# Patient Record
Sex: FEMALE | Race: WHITE | Employment: OTHER | URBAN - METROPOLITAN AREA
[De-identification: names, ages, dates, MRNs, and addresses within clinical notes are randomized per-mention and may not be internally consistent; named-entity substitution may affect disease eponyms.]

---

## 2020-01-08 ENCOUNTER — HOSPITAL ENCOUNTER (OUTPATIENT)
Dept: WOMENS IMAGING | Age: 60
Discharge: HOME | End: 2020-01-08
Payer: COMMERCIAL

## 2020-01-08 PROCEDURE — 77063 BREAST TOMOSYNTHESIS BI: CPT

## 2020-03-05 RX ORDER — OXYBUTYNIN CHLORIDE 5 MG/1
5 TABLET ORAL DAILY
COMMUNITY
End: 2022-09-16 | Stop reason: SDUPTHER

## 2020-03-05 RX ORDER — IBUPROFEN 200 MG
1 CAPSULE ORAL 2 TIMES DAILY
COMMUNITY

## 2020-03-05 RX ORDER — TRAZODONE HYDROCHLORIDE 50 MG/1
100 TABLET ORAL NIGHTLY
COMMUNITY
End: 2022-07-25 | Stop reason: SDUPTHER

## 2020-03-05 RX ORDER — M-VIT,TX,IRON,MINS/CALC/FOLIC 27MG-0.4MG
1 TABLET ORAL DAILY
COMMUNITY

## 2020-03-05 NOTE — PROGRESS NOTES
Obstructive Sleep Apnea (CHANTELL) Screening     Patient:  Matias Flynn    YOB: 1960      Medical Record #:  [id number]                     Date:  3/5/2020     1. Are you a loud and/or regular snorer? []  Yes       [x] No    2. Have you been observed to gasp or stop breathing during sleep? []  Yes       [x] No    3. Do you feel tired or groggy upon awakening or do you awaken with a headache? [x]  Yes       [] No    4. Are you often tired or fatigued during the wake time hours? []  Yes       [x] No    5. Do you fall asleep sitting, reading, watching TV or driving? []  Yes       [x] No    6. Do you often have problems with memory or concentration? []  Yes       [x] No    **If patient's score is ? 3 they are considered high risk for CHANTELL. Notify the anesthesiologist of the high risk and document in focus note. Note:  If the patient's BMI is more than 35 kg m¯² , has neck circumference > 40 cm, and/or high blood pressure the risk is greater (© American Sleep Apnea Association, 2006).

## 2020-03-15 ENCOUNTER — ANESTHESIA EVENT (OUTPATIENT)
Dept: OPERATING ROOM | Age: 60
End: 2020-03-15
Payer: COMMERCIAL

## 2020-03-16 ENCOUNTER — ANESTHESIA (OUTPATIENT)
Dept: OPERATING ROOM | Age: 60
End: 2020-03-16
Payer: COMMERCIAL

## 2020-03-16 ENCOUNTER — APPOINTMENT (OUTPATIENT)
Dept: GENERAL RADIOLOGY | Age: 60
End: 2020-03-16
Attending: ORTHOPAEDIC SURGERY
Payer: COMMERCIAL

## 2020-03-16 ENCOUNTER — HOSPITAL ENCOUNTER (OUTPATIENT)
Age: 60
Setting detail: OUTPATIENT SURGERY
Discharge: HOME | End: 2020-03-16
Attending: ORTHOPAEDIC SURGERY | Admitting: ORTHOPAEDIC SURGERY
Payer: COMMERCIAL

## 2020-03-16 VITALS
BODY MASS INDEX: 25.69 KG/M2 | DIASTOLIC BLOOD PRESSURE: 63 MMHG | HEART RATE: 83 BPM | HEIGHT: 63 IN | WEIGHT: 145 LBS | RESPIRATION RATE: 16 BRPM | OXYGEN SATURATION: 96 % | SYSTOLIC BLOOD PRESSURE: 124 MMHG | TEMPERATURE: 98.3 F

## 2020-03-16 VITALS — SYSTOLIC BLOOD PRESSURE: 101 MMHG | OXYGEN SATURATION: 98 % | DIASTOLIC BLOOD PRESSURE: 65 MMHG

## 2020-03-16 PROCEDURE — 6360000002 HC RX W HCPCS: Performed by: NURSE ANESTHETIST, CERTIFIED REGISTERED

## 2020-03-16 PROCEDURE — 3600000003 HC SURGERY LEVEL 3 BASE: Performed by: ORTHOPAEDIC SURGERY

## 2020-03-16 PROCEDURE — 2500000003 HC RX 250 WO HCPCS: Performed by: NURSE ANESTHETIST, CERTIFIED REGISTERED

## 2020-03-16 PROCEDURE — 3700000000 HC ANESTHESIA ATTENDED CARE: Performed by: ORTHOPAEDIC SURGERY

## 2020-03-16 PROCEDURE — 6360000004 HC RX CONTRAST MEDICATION: Performed by: ORTHOPAEDIC SURGERY

## 2020-03-16 PROCEDURE — 7100000011 HC PHASE II RECOVERY - ADDTL 15 MIN: Performed by: ORTHOPAEDIC SURGERY

## 2020-03-16 PROCEDURE — 3700000001 HC ADD 15 MINUTES (ANESTHESIA): Performed by: ORTHOPAEDIC SURGERY

## 2020-03-16 PROCEDURE — 2500000003 HC RX 250 WO HCPCS: Performed by: ORTHOPAEDIC SURGERY

## 2020-03-16 PROCEDURE — 6360000002 HC RX W HCPCS: Performed by: ORTHOPAEDIC SURGERY

## 2020-03-16 PROCEDURE — 20610 DRAIN/INJ JOINT/BURSA W/O US: CPT

## 2020-03-16 PROCEDURE — 7100000000 HC PACU RECOVERY - FIRST 15 MIN: Performed by: ORTHOPAEDIC SURGERY

## 2020-03-16 PROCEDURE — 3600000013 HC SURGERY LEVEL 3 ADDTL 15MIN: Performed by: ORTHOPAEDIC SURGERY

## 2020-03-16 PROCEDURE — 7100000001 HC PACU RECOVERY - ADDTL 15 MIN: Performed by: ORTHOPAEDIC SURGERY

## 2020-03-16 PROCEDURE — 2580000003 HC RX 258: Performed by: ANESTHESIOLOGY

## 2020-03-16 PROCEDURE — 2709999900 HC NON-CHARGEABLE SUPPLY: Performed by: ORTHOPAEDIC SURGERY

## 2020-03-16 PROCEDURE — 3209999900 FLUORO FOR SURGICAL PROCEDURES

## 2020-03-16 PROCEDURE — 7100000010 HC PHASE II RECOVERY - FIRST 15 MIN: Performed by: ORTHOPAEDIC SURGERY

## 2020-03-16 RX ORDER — DIPHENHYDRAMINE HYDROCHLORIDE 50 MG/ML
6.25 INJECTION INTRAMUSCULAR; INTRAVENOUS
Status: DISCONTINUED | OUTPATIENT
Start: 2020-03-16 | End: 2020-03-16 | Stop reason: HOSPADM

## 2020-03-16 RX ORDER — OXYCODONE HYDROCHLORIDE AND ACETAMINOPHEN 5; 325 MG/1; MG/1
1 TABLET ORAL PRN
Status: DISCONTINUED | OUTPATIENT
Start: 2020-03-16 | End: 2020-03-16 | Stop reason: HOSPADM

## 2020-03-16 RX ORDER — HYDRALAZINE HYDROCHLORIDE 20 MG/ML
5 INJECTION INTRAMUSCULAR; INTRAVENOUS EVERY 30 MIN PRN
Status: DISCONTINUED | OUTPATIENT
Start: 2020-03-16 | End: 2020-03-16 | Stop reason: HOSPADM

## 2020-03-16 RX ORDER — BUPIVACAINE HYDROCHLORIDE 2.5 MG/ML
INJECTION, SOLUTION INFILTRATION; PERINEURAL PRN
Status: DISCONTINUED | OUTPATIENT
Start: 2020-03-16 | End: 2020-03-16 | Stop reason: ALTCHOICE

## 2020-03-16 RX ORDER — TRIAMCINOLONE ACETONIDE 40 MG/ML
INJECTION, SUSPENSION INTRA-ARTICULAR; INTRAMUSCULAR PRN
Status: DISCONTINUED | OUTPATIENT
Start: 2020-03-16 | End: 2020-03-16 | Stop reason: ALTCHOICE

## 2020-03-16 RX ORDER — SODIUM CHLORIDE, SODIUM LACTATE, POTASSIUM CHLORIDE, CALCIUM CHLORIDE 600; 310; 30; 20 MG/100ML; MG/100ML; MG/100ML; MG/100ML
INJECTION, SOLUTION INTRAVENOUS CONTINUOUS
Status: DISCONTINUED | OUTPATIENT
Start: 2020-03-16 | End: 2020-03-16 | Stop reason: HOSPADM

## 2020-03-16 RX ORDER — LABETALOL HYDROCHLORIDE 5 MG/ML
5 INJECTION, SOLUTION INTRAVENOUS
Status: DISCONTINUED | OUTPATIENT
Start: 2020-03-16 | End: 2020-03-16 | Stop reason: HOSPADM

## 2020-03-16 RX ORDER — OXYCODONE HYDROCHLORIDE AND ACETAMINOPHEN 5; 325 MG/1; MG/1
2 TABLET ORAL PRN
Status: DISCONTINUED | OUTPATIENT
Start: 2020-03-16 | End: 2020-03-16 | Stop reason: HOSPADM

## 2020-03-16 RX ORDER — SODIUM CHLORIDE 0.9 % (FLUSH) 0.9 %
10 SYRINGE (ML) INJECTION EVERY 12 HOURS SCHEDULED
Status: DISCONTINUED | OUTPATIENT
Start: 2020-03-16 | End: 2020-03-16 | Stop reason: HOSPADM

## 2020-03-16 RX ORDER — ONDANSETRON 2 MG/ML
4 INJECTION INTRAMUSCULAR; INTRAVENOUS EVERY 30 MIN PRN
Status: DISCONTINUED | OUTPATIENT
Start: 2020-03-16 | End: 2020-03-16 | Stop reason: HOSPADM

## 2020-03-16 RX ORDER — PROPOFOL 10 MG/ML
INJECTION, EMULSION INTRAVENOUS PRN
Status: DISCONTINUED | OUTPATIENT
Start: 2020-03-16 | End: 2020-03-16 | Stop reason: SDUPTHER

## 2020-03-16 RX ORDER — LIDOCAINE HYDROCHLORIDE 10 MG/ML
1 INJECTION, SOLUTION EPIDURAL; INFILTRATION; INTRACAUDAL; PERINEURAL
Status: DISCONTINUED | OUTPATIENT
Start: 2020-03-16 | End: 2020-03-16 | Stop reason: HOSPADM

## 2020-03-16 RX ORDER — LIDOCAINE HYDROCHLORIDE 20 MG/ML
INJECTION, SOLUTION EPIDURAL; INFILTRATION; INTRACAUDAL; PERINEURAL PRN
Status: DISCONTINUED | OUTPATIENT
Start: 2020-03-16 | End: 2020-03-16 | Stop reason: SDUPTHER

## 2020-03-16 RX ORDER — SODIUM CHLORIDE 0.9 % (FLUSH) 0.9 %
10 SYRINGE (ML) INJECTION PRN
Status: DISCONTINUED | OUTPATIENT
Start: 2020-03-16 | End: 2020-03-16 | Stop reason: HOSPADM

## 2020-03-16 RX ADMIN — CEFAZOLIN 2 G: 10 INJECTION, POWDER, FOR SOLUTION INTRAVENOUS; PARENTERAL at 07:01

## 2020-03-16 RX ADMIN — SODIUM CHLORIDE, POTASSIUM CHLORIDE, SODIUM LACTATE AND CALCIUM CHLORIDE: 600; 310; 30; 20 INJECTION, SOLUTION INTRAVENOUS at 07:01

## 2020-03-16 RX ADMIN — LIDOCAINE HYDROCHLORIDE 50 MG: 20 INJECTION, SOLUTION EPIDURAL; INFILTRATION; INTRACAUDAL; PERINEURAL at 07:03

## 2020-03-16 RX ADMIN — PROPOFOL 150 MG: 10 INJECTION, EMULSION INTRAVENOUS at 07:03

## 2020-03-16 RX ADMIN — PROPOFOL 50 MG: 10 INJECTION, EMULSION INTRAVENOUS at 07:04

## 2020-03-16 RX ADMIN — SODIUM CHLORIDE, POTASSIUM CHLORIDE, SODIUM LACTATE AND CALCIUM CHLORIDE: 600; 310; 30; 20 INJECTION, SOLUTION INTRAVENOUS at 06:07

## 2020-03-16 ASSESSMENT — PAIN - FUNCTIONAL ASSESSMENT: PAIN_FUNCTIONAL_ASSESSMENT: 0-10

## 2020-03-16 ASSESSMENT — PULMONARY FUNCTION TESTS: PIF_VALUE: 1

## 2020-03-16 NOTE — ANESTHESIA POSTPROCEDURE EVALUATION
Department of Anesthesiology  Postprocedure Note    Patient: Gunnar Caldwell  MRN: [id number]  YOB: 1960  Date of evaluation: 3/16/2020  Time:  6:48 PM     Procedure Summary     Date:  03/16/20 Room / Location:  [facility]    Anesthesia Start:  9650 Anesthesia Stop:  0900    Procedure:  LEFT HIP INJECTION (Left Hip) Diagnosis:       Left hip pain      (LEFT HIP PAIN)    Surgeon:  Maninder Manley MD Responsible Provider:      Anesthesia Type:  general ASA Status:  2          Anesthesia Type: general    Lizz Phase I: Lizz Score: 9    Lizz Phase II: Lizz Score: 10    Last vitals: Reviewed and per EMR flowsheets.        Anesthesia Post Evaluation    Comments: Postoperative Anesthesia Note    Name:    Gunnar Caldwell  MRN:      [id number]    Patient Vitals in the past 12 hrs:  03/16/20 0739, BP:124/63, Temp:98.3 °F (36.8 °C), Temp src:Temporal, Pulse:83, Resp:16, SpO2:96 %  03/16/20 0730, BP:(!) 109/58, Pulse:68, Resp:18, SpO2:97 %  03/16/20 0722, BP:100/60, Pulse:70, Resp:18, SpO2:95 %  03/16/20 0717, BP:(!) 100/59, Temp:97.8 °F (36.6 °C), Temp src:Temporal, Pulse:72, Resp:11, SpO2:93 %     LABS:    CBC  Lab Results       Component                Value               Date/Time                  WBC                      6.5                 10/21/2013 05:49 AM        HGB                      9.8 (L)             10/21/2013 05:49 AM        HCT                      29.0 (L)            10/21/2013 05:49 AM        PLT                      177                 10/21/2013 05:49 AM   RENAL  Lab Results       Component                Value               Date/Time                  NA                       136                 10/21/2013 05:49 AM        K                        4.3                 10/21/2013 05:49 AM        CL                       100                 10/21/2013 05:49 AM        CO2                      32                  10/21/2013 05:49 AM        BUN                      6 (L) 10/21/2013 05:49 AM        CREATININE               0.6                 10/21/2013 05:49 AM        GLUCOSE                  93                  10/21/2013 05:49 AM   NELLI  Lab Results       Component                Value               Date/Time                  PROTIME                  10.0                10/17/2013 12:23 PM        INR                      0.89                10/17/2013 12:23 PM     Intake & Output: In: 150 (I.V.:150)  Out: -     Nausea & Vomiting:  No    Level of Consciousness:  Awake    Pain Assessment:  Adequate analgesia    Anesthesia Complications:  No apparent anesthetic complications    SUMMARY      Vital signs stable  OK to discharge from Stage I post anesthesia care.   Care transferred from Anesthesiology department on discharge from perioperative area

## 2020-03-22 NOTE — OP NOTE
[address] [address]                                OPERATIVE REPORT    PATIENT NAME: Colleen Lim                       :        1960  MED REC NO:   [id number]                          ROOM:  ACCOUNT NO:   [de-identified]                           ADMIT DATE: 2020  PROVIDER:     Evette Spencer MD    DATE OF PROCEDURE:  2020    PREOPERATIVE DIAGNOSIS:  Left hip pain status post bipolar hip. OPERATION PERFORMED:  aspiration under fluoroscopic guidance. SURGEON:  Evette Spencer MD    COMPLICATIONS:  None. DRAINS AND TUBES:  None. INDICATIONS FOR SURGERY:  A pleasant female, bipolar hip with pain,  presents for the above procedure. Risks, benefits, and alternatives  were discussed including neurologic injury, vascular injury, infection,  and DVT. Other potential complications were discussed, alternatives  were discussed and she consented to the procedure. OPERATIVE PROCEDURE:  The patient was taken to the operating room and  placed supine on the operating table after successful induction of  general anesthesia. The left hip was prepped and draped in the usual  fashion. The spinal needle was placed through the lateral aspect of the  thigh to inject water-soluble contrast as an arthrogram to confirm  needle placement, followed by injection of 2 mL of Kenalog and 4 mL of  Marcaine. The needle was withdrawn and the patient was awakened and  taken to Recovery, where she arrived in a stable condition.         Malina Reese MD    D: 2020 12:27:57       T: 2020 19:20:48     [email or web address]  Job#: [id number]     Doc#: [id number]    CC:

## 2020-08-14 ENCOUNTER — TELEPHONE (OUTPATIENT)
Dept: ORTHOPEDIC SURGERY | Age: 60
End: 2020-08-14

## 2021-03-12 ENCOUNTER — HOSPITAL ENCOUNTER (OUTPATIENT)
Dept: WOMENS IMAGING | Age: 61
Discharge: HOME | End: 2021-03-12
Payer: COMMERCIAL

## 2021-03-12 DIAGNOSIS — Z12.31 VISIT FOR SCREENING MAMMOGRAM: ICD-10-CM

## 2021-03-12 PROCEDURE — 77063 BREAST TOMOSYNTHESIS BI: CPT

## 2022-03-28 SDOH — HEALTH STABILITY: PHYSICAL HEALTH: ON AVERAGE, HOW MANY DAYS PER WEEK DO YOU ENGAGE IN MODERATE TO STRENUOUS EXERCISE (LIKE A BRISK WALK)?: 0 DAYS

## 2022-03-28 SDOH — HEALTH STABILITY: PHYSICAL HEALTH: ON AVERAGE, HOW MANY MINUTES DO YOU ENGAGE IN EXERCISE AT THIS LEVEL?: 0 MIN

## 2022-03-29 ENCOUNTER — OFFICE VISIT (OUTPATIENT)
Dept: FAMILY MEDICINE CLINIC | Age: 62
End: 2022-03-29
Payer: MEDICARE

## 2022-03-29 VITALS
HEART RATE: 70 BPM | BODY MASS INDEX: 25.91 KG/M2 | TEMPERATURE: 98.1 F | HEIGHT: 63 IN | WEIGHT: 146.2 LBS | DIASTOLIC BLOOD PRESSURE: 70 MMHG | SYSTOLIC BLOOD PRESSURE: 110 MMHG | OXYGEN SATURATION: 97 %

## 2022-03-29 DIAGNOSIS — K21.9 GASTROESOPHAGEAL REFLUX DISEASE WITHOUT ESOPHAGITIS: ICD-10-CM

## 2022-03-29 DIAGNOSIS — M25.559 HIP PAIN: ICD-10-CM

## 2022-03-29 DIAGNOSIS — Z76.89 ENCOUNTER TO ESTABLISH CARE: Primary | ICD-10-CM

## 2022-03-29 DIAGNOSIS — Z12.31 ENCOUNTER FOR SCREENING MAMMOGRAM FOR MALIGNANT NEOPLASM OF BREAST: ICD-10-CM

## 2022-03-29 PROCEDURE — G8484 FLU IMMUNIZE NO ADMIN: HCPCS | Performed by: NURSE PRACTITIONER

## 2022-03-29 PROCEDURE — 99204 OFFICE O/P NEW MOD 45 MIN: CPT | Performed by: NURSE PRACTITIONER

## 2022-03-29 PROCEDURE — 1036F TOBACCO NON-USER: CPT | Performed by: NURSE PRACTITIONER

## 2022-03-29 PROCEDURE — G8427 DOCREV CUR MEDS BY ELIG CLIN: HCPCS | Performed by: NURSE PRACTITIONER

## 2022-03-29 PROCEDURE — G8419 CALC BMI OUT NRM PARAM NOF/U: HCPCS | Performed by: NURSE PRACTITIONER

## 2022-03-29 PROCEDURE — 3017F COLORECTAL CA SCREEN DOC REV: CPT | Performed by: NURSE PRACTITIONER

## 2022-03-29 RX ORDER — OMEPRAZOLE 20 MG/1
20 CAPSULE, DELAYED RELEASE ORAL DAILY
Qty: 90 CAPSULE | Refills: 2 | Status: SHIPPED | OUTPATIENT
Start: 2022-03-29 | End: 2022-09-19

## 2022-03-29 SDOH — ECONOMIC STABILITY: FOOD INSECURITY: WITHIN THE PAST 12 MONTHS, THE FOOD YOU BOUGHT JUST DIDN'T LAST AND YOU DIDN'T HAVE MONEY TO GET MORE.: NEVER TRUE

## 2022-03-29 SDOH — ECONOMIC STABILITY: FOOD INSECURITY: WITHIN THE PAST 12 MONTHS, YOU WORRIED THAT YOUR FOOD WOULD RUN OUT BEFORE YOU GOT MONEY TO BUY MORE.: NEVER TRUE

## 2022-03-29 ASSESSMENT — ENCOUNTER SYMPTOMS
VOMITING: 0
WHEEZING: 0
COUGH: 0
DIARRHEA: 0
NAUSEA: 0
SHORTNESS OF BREATH: 0
ABDOMINAL PAIN: 0

## 2022-03-29 ASSESSMENT — SOCIAL DETERMINANTS OF HEALTH (SDOH): HOW HARD IS IT FOR YOU TO PAY FOR THE VERY BASICS LIKE FOOD, HOUSING, MEDICAL CARE, AND HEATING?: NOT HARD AT ALL
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
## 2022-03-29 NOTE — PATIENT INSTRUCTIONS
Patient Education        Hip Pain: Care Instructions  Your Care Instructions     Hip pain may be caused by many things, including overuse, a fall, or a twisting movement. Another cause of hip pain is arthritis. Your pain may increase whenyou stand up, walk, or squat. The pain may come and go or may be constant. Home treatment can help relieve hip pain, swelling, and stiffness. If your painis ongoing, you may need more tests and treatment. Follow-up care is a key part of your treatment and safety. Be sure to make and go to all appointments, and call your doctor if you are having problems. It's also a good idea to know your test results and keep alist of the medicines you take. How can you care for yourself at home?  Take pain medicines exactly as directed. ? If the doctor gave you a prescription medicine for pain, take it as prescribed. ? If you are not taking a prescription pain medicine, ask your doctor if you can take an over-the-counter medicine.  Rest and protect your hip. Take a break from any activity, including standing or walking, that may cause pain.  Put ice or a cold pack against your hip for 10 to 20 minutes at a time. Try to do this every 1 to 2 hours for the next 3 days (when you are awake) or until the swelling goes down. Put a thin cloth between the ice and your skin.  Sleep on your healthy side with a pillow between your knees, or sleep on your back with pillows under your knees.  If there is no swelling, you can put moist heat, a heating pad, or a warm cloth on your hip. Do gentle stretching exercises to help keep your hip flexible.  Learn how to prevent falls. Have your vision and hearing checked regularly. Wear slippers or shoes with a nonskid sole.  Stay at a healthy weight.  Wear comfortable shoes. When should you call for help? Call 911 anytime you think you may need emergency care.  For example, call if:     You have sudden chest pain and shortness of breath, or you cough up blood.      You are not able to stand or walk or bear weight.      Your buttocks, legs, or feet feel numb or tingly.      Your leg or foot is cool or pale or changes color.      You have severe pain. Call your doctor now or seek immediate medical care if:     You have signs of infection, such as:  ? Increased pain, swelling, warmth, or redness in the hip area. ? Red streaks leading from the hip area. ? Pus draining from the hip area. ? A fever.      You have signs of a blood clot, such as:  ? Pain in your calf, back of the knee, thigh, or groin. ? Redness and swelling in your leg or groin.      You are not able to bend, straighten, or move your leg normally.      You have trouble urinating or having bowel movements. Watch closely for changes in your health, and be sure to contact your doctor if:     You do not get better as expected. Where can you learn more? Go to https://CleanSlate.ByteActive. org and sign in to your PunchTab account. Enter U923 in the Step-In box to learn more about \"Hip Pain: Care Instructions. \"     If you do not have an account, please click on the \"Sign Up Now\" link. Current as of: July 1, 2021               Content Version: 13.2  © 2006-2022 Healthwise, Incorporated. Care instructions adapted under license by [facility] ([facility]). If you have questions about a medical condition or this instruction, always ask your healthcare professional. [place] [address] any warranty or liability for your use of this information.

## 2022-04-04 ENCOUNTER — OFFICE VISIT (OUTPATIENT)
Dept: ORTHOPEDIC SURGERY | Age: 62
End: 2022-04-04
Payer: MEDICARE

## 2022-04-04 ENCOUNTER — TELEPHONE (OUTPATIENT)
Dept: ORTHOPEDIC SURGERY | Age: 62
End: 2022-04-04

## 2022-04-04 VITALS — HEIGHT: 63 IN | WEIGHT: 146 LBS | BODY MASS INDEX: 25.87 KG/M2

## 2022-04-04 DIAGNOSIS — Z96.642 HISTORY OF LEFT HIP HEMIARTHROPLASTY: Primary | ICD-10-CM

## 2022-04-04 DIAGNOSIS — M16.7 OTHER SECONDARY OSTEOARTHRITIS OF LEFT HIP: ICD-10-CM

## 2022-04-04 PROCEDURE — G8427 DOCREV CUR MEDS BY ELIG CLIN: HCPCS | Performed by: ORTHOPAEDIC SURGERY

## 2022-04-04 PROCEDURE — 99213 OFFICE O/P EST LOW 20 MIN: CPT | Performed by: ORTHOPAEDIC SURGERY

## 2022-04-04 PROCEDURE — 3017F COLORECTAL CA SCREEN DOC REV: CPT | Performed by: ORTHOPAEDIC SURGERY

## 2022-04-04 PROCEDURE — G8419 CALC BMI OUT NRM PARAM NOF/U: HCPCS | Performed by: ORTHOPAEDIC SURGERY

## 2022-04-04 PROCEDURE — 1036F TOBACCO NON-USER: CPT | Performed by: ORTHOPAEDIC SURGERY

## 2022-04-04 RX ORDER — IBUPROFEN 200 MG
200 TABLET ORAL EVERY 6 HOURS PRN
Status: ON HOLD | COMMUNITY
End: 2022-05-18 | Stop reason: HOSPADM
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
## 2022-04-06 ENCOUNTER — TELEPHONE (OUTPATIENT)
Dept: ORTHOPEDIC SURGERY | Age: 62
End: 2022-04-06

## 2022-04-19 ENCOUNTER — TELEPHONE (OUTPATIENT)
Dept: FAMILY MEDICINE CLINIC | Age: 62
End: 2022-04-19

## 2022-04-19 NOTE — TELEPHONE ENCOUNTER
Pt is scheduled for a preop h & p on 5-9 and she wants to know if Kimani Hernandez wants for her to get any additional bw done? Also pt signed a release form to have her records released from her previous PCP and she wants to know if Kimani Hernandez wants any other records released? Pl advise.

## 2022-04-19 NOTE — TELEPHONE ENCOUNTER
We can wait and see what labs her last PCP completed. The only additional labs I would add would be a Lipid panel, if this was completed and normal at her last PCP office it may not be needed. If she seen any specialist in the past, we could get those records.

## 2022-04-21 ENCOUNTER — TELEPHONE (OUTPATIENT)
Dept: ORTHOPEDIC SURGERY | Age: 62
End: 2022-04-21

## 2022-04-21 NOTE — TELEPHONE ENCOUNTER
Auth: # [id number]    Date: 05/18/22 thru 06/17/22  Type of SX:  OUTPATIENT/Observation  Location: W  CPT: 56754   DX Code: M00.139B  SX area:  hip  Insurance: [facility]

## 2022-05-05 NOTE — PROGRESS NOTES
Preoperative H&P to be done byDALE Denney NP on 5/5/22 [facility]).   JET class on 5/9/22    [facility] office called to have her make addendum in her h&p that patient is medically cleared left message with Cindy at office    Calleen Kussmaul from [facility] Cruz office called and stated that mary roberto is cleared for surgery and addendum note in epic and under h&p

## 2022-05-05 NOTE — PROGRESS NOTES
PRE-OP INSTRUCTIONS     · Do not eat or drink anything after 12:00 midnight prior to surgery. This includes water, chewing gum, mints and ice chips. You may brush your teeth and gargle the morning of surgery but DO  NOT SWALLOW THE WATER. Take the following medications with a small sip of water on the morning of surgery:    · If you use an inhaler, please use it the morning of surgery and bring with you to hospital.    · You may be asked to stop blood thinners such as:  Coumadin, Plavix, Fragmin and lovenox. Please check with your doctor before stopping these or any other medications. · Aspirin, ibuprofen, advil and naproxen, any anti-inflammatory products should be stopped for a week prior to your surgery. · Do not smoke and do not drink any alcoholic beverages 24 hours prior to your surgery. · Please do not wear any jewelry or body piercings on the day of surgery. · Please wear something simple, loose fitting clothing to the hospital.  Do not wear any make-up(including eye make-up) or nail polish on your fingers and toes. · As part of our patient safety program to minimize surgical infections, we ask you to do the followin. Please notify your surgeon if you develop any illness between now                          and the day of your surgery. This includes a cough, cold, fever, sore                       throat, nausea, vomiting, diarrhea, etc.  Also please notify your                                  surgeon if you experience dizziness, shortness of breath or                       Blurred vision between now and the time of your surgery. 2.  Please notify your surgeon of any open or redden areas that may                        look infected                     3.  DO NOT shave your operative site 96 hours(four days) prior to                                  surgery.                         4.  Shower the week before surgery with an antibacterial soap, such as                       dial, safeguard, etc.                         5.  Three(3) days prior to your surgery, cleanse the operative site with                          Hibiclens(anti-microbial soap). This soap may dry your skin, please                          do not apply any oils or lotions     · Please bring your insurance card and picture ID day of surgery    · If you have a living will or durable power of attorny. Please bring in a copy of your advanced directives. · If you have dentures, they will be removed before going to the OR, we will provide you with a container. If you wear contact lenses or glasses, they will be removes, please bring a case for them. · Have you seen your family doctor for a pre-op history and physical.      · Surgery scheduler will call you 48 hours prior to your surgery to notify you of the time of your surgery and the time you will need to be at hospital...patients are asked to arrive 21/2 hours prior to surgery. ·  Please call Pre-Admission testing if you have any further questions. [id number] [facility] [place] testing phone number:  [phone]      Thank You for choosing [facility]!!    SAFETY FIRST. .call before you fall    C-Difficile admission screening and protocol:  * Admitted with diarrhea? no  *Prior history of C-Diff. In last 3 months? no  *Antibiotic use in the past 6-8 weeks? .no  *Prior hospitalization or nursing home in the last month? no

## 2022-05-06 ENCOUNTER — OFFICE VISIT (OUTPATIENT)
Dept: FAMILY MEDICINE CLINIC | Age: 62
End: 2022-05-06
Payer: MEDICARE

## 2022-05-06 VITALS
HEIGHT: 63 IN | DIASTOLIC BLOOD PRESSURE: 60 MMHG | OXYGEN SATURATION: 98 % | TEMPERATURE: 98.3 F | SYSTOLIC BLOOD PRESSURE: 110 MMHG | BODY MASS INDEX: 25.55 KG/M2 | WEIGHT: 144.2 LBS | HEART RATE: 80 BPM

## 2022-05-06 DIAGNOSIS — K21.9 GASTROESOPHAGEAL REFLUX DISEASE WITHOUT ESOPHAGITIS: ICD-10-CM

## 2022-05-06 DIAGNOSIS — Z01.818 PREOP EXAMINATION: Primary | ICD-10-CM

## 2022-05-06 DIAGNOSIS — M25.552 CHRONIC LEFT HIP PAIN: ICD-10-CM

## 2022-05-06 DIAGNOSIS — E87.1 HYPONATREMIA: ICD-10-CM

## 2022-05-06 DIAGNOSIS — M81.0 POSTMENOPAUSAL OSTEOPOROSIS: ICD-10-CM

## 2022-05-06 DIAGNOSIS — G89.29 CHRONIC LEFT HIP PAIN: ICD-10-CM

## 2022-05-06 PROCEDURE — 3017F COLORECTAL CA SCREEN DOC REV: CPT | Performed by: NURSE PRACTITIONER

## 2022-05-06 PROCEDURE — 99214 OFFICE O/P EST MOD 30 MIN: CPT | Performed by: NURSE PRACTITIONER

## 2022-05-06 PROCEDURE — G8427 DOCREV CUR MEDS BY ELIG CLIN: HCPCS | Performed by: NURSE PRACTITIONER

## 2022-05-06 PROCEDURE — 1036F TOBACCO NON-USER: CPT | Performed by: NURSE PRACTITIONER

## 2022-05-06 PROCEDURE — G8419 CALC BMI OUT NRM PARAM NOF/U: HCPCS | Performed by: NURSE PRACTITIONER

## 2022-05-06 RX ORDER — ZOLEDRONIC ACID 5 MG/100ML
5 INJECTION, SOLUTION INTRAVENOUS ONCE
COMMUNITY

## 2022-05-06 ASSESSMENT — ENCOUNTER SYMPTOMS
APNEA: 0
COLOR CHANGE: 0
BACK PAIN: 1
ROS SKIN COMMENTS: NO HISTORY OF MRSA
CONSTIPATION: 0
TROUBLE SWALLOWING: 0
SHORTNESS OF BREATH: 0
COUGH: 0
CHEST TIGHTNESS: 0
WHEEZING: 0
ABDOMINAL PAIN: 0
RHINORRHEA: 0
SORE THROAT: 0
NAUSEA: 0
DIARRHEA: 0
VOMITING: 0

## 2022-05-06 ASSESSMENT — PATIENT HEALTH QUESTIONNAIRE - PHQ9
4. FEELING TIRED OR HAVING LITTLE ENERGY: 0
6. FEELING BAD ABOUT YOURSELF - OR THAT YOU ARE A FAILURE OR HAVE LET YOURSELF OR YOUR FAMILY DOWN: 0
SUM OF ALL RESPONSES TO PHQ QUESTIONS 1-9: 0
7. TROUBLE CONCENTRATING ON THINGS, SUCH AS READING THE NEWSPAPER OR WATCHING TELEVISION: 0
SUM OF ALL RESPONSES TO PHQ QUESTIONS 1-9: 0
9. THOUGHTS THAT YOU WOULD BE BETTER OFF DEAD, OR OF HURTING YOURSELF: 0
5. POOR APPETITE OR OVEREATING: 0
3. TROUBLE FALLING OR STAYING ASLEEP: 0
8. MOVING OR SPEAKING SO SLOWLY THAT OTHER PEOPLE COULD HAVE NOTICED. OR THE OPPOSITE, BEING SO FIGETY OR RESTLESS THAT YOU HAVE BEEN MOVING AROUND A LOT MORE THAN USUAL: 0
SUM OF ALL RESPONSES TO PHQ QUESTIONS 1-9: 0
SUM OF ALL RESPONSES TO PHQ QUESTIONS 1-9: 0
10. IF YOU CHECKED OFF ANY PROBLEMS, HOW DIFFICULT HAVE THESE PROBLEMS MADE IT FOR YOU TO DO YOUR WORK, TAKE CARE OF THINGS AT HOME, OR GET ALONG WITH OTHER PEOPLE: 0
SUM OF ALL RESPONSES TO PHQ9 QUESTIONS 1 & 2: 0
1. LITTLE INTEREST OR PLEASURE IN DOING THINGS: 0
2. FEELING DOWN, DEPRESSED OR HOPELESS: 0

## 2022-05-06 NOTE — PATIENT INSTRUCTIONS
Patient Education        Hyponatremia: Care Instructions  Your Care Instructions     Hyponatremia (say \"pt-el-iil-TREE-eduin-uh\") means that you don't have enough sodium in your blood. It can cause nausea, vomiting, and headaches. Or you maynot feel hungry. In serious cases, it can cause seizures, a coma, or even death. Hyponatremia is not a disease. It is a problem caused by something else, suchas medicines or exercising for a long time in hot weather. You can get hyponatremia if you lose a lot of fluids and then you drink a lot of water or other liquids that don't have much sodium. You can also get it ifyou have kidney, liver, heart, or other health problems. Treatment is focused on getting your sodium levels back to normal.  Follow-up care is a key part of your treatment and safety. Be sure to make and go to all appointments, and call your doctor if you are having problems. It's also a good idea to know your test results and keep alist of the medicines you take. How can you care for yourself at home?  If your doctor recommends it, drink fluids that have sodium. Sports drinks are a good choice. Or you can eat salty foods.  If your doctor recommends it, limit the amount of water you drink. And limit fluids that are mostly water. These include tea, coffee, and juice.  Take your medicines exactly as prescribed. Call your doctor if you have any problems with your medicine.  Get your sodium levels tested when your doctor tells you to. When should you call for help? Call 911 anytime you think you may need emergency care. For example, call if:     You have a seizure.      You passed out (lost consciousness).    Call your doctor now or seek immediate medical care if:     You are confused or it is hard to focus.      You have little or no appetite.      You feel sick to your stomach or you vomit.      You have a headache.      You have mood changes.      You feel more tired than usual.   Watch closely for changes in your health, and be sure to contact your doctor if:     You do not get better as expected. Where can you learn more? Go to [email or web address] and sign in to your eSnips account. Enter V285 in the [facility] box to learn more about \"Hyponatremia: Care Instructions. \"     If you do not have an account, please click on the \"Sign Up Now\" link. Current as of: June 17, 2021               Content Version: 13.2  © 3877-5065 Healthwise, Incorporated. Care instructions adapted under license by [facility] ([facility]). If you have questions about a medical condition or this instruction, always ask your healthcare professional. [address] any warranty or liability for your use of this information.
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
## 2022-05-09 ENCOUNTER — HOSPITAL ENCOUNTER (OUTPATIENT)
Dept: PREADMISSION TESTING | Age: 62
Discharge: HOME | End: 2022-05-13
Payer: MEDICARE

## 2022-05-09 DIAGNOSIS — M16.7 OTHER SECONDARY OSTEOARTHRITIS OF LEFT HIP: ICD-10-CM

## 2022-05-09 LAB
ABO/RH: NORMAL
ALBUMIN SERPL-MCNC: 4.7 G/DL (ref 3.4–5)
ANION GAP SERPL CALCULATED.3IONS-SCNC: 12 MMOL/L (ref 3–16)
ANTIBODY SCREEN: NORMAL
APTT: 33.1 SEC (ref 26.2–38.6)
BASOPHILS ABSOLUTE: 0 K/UL (ref 0–0.2)
BASOPHILS RELATIVE PERCENT: 0.6 %
BILIRUBIN URINE: NEGATIVE
BLOOD, URINE: NEGATIVE
BUN BLDV-MCNC: 12 MG/DL (ref 7–20)
CALCIUM SERPL-MCNC: 9.2 MG/DL (ref 8.3–10.6)
CHLORIDE BLD-SCNC: 98 MMOL/L (ref 99–110)
CLARITY: CLEAR
CO2: 22 MMOL/L (ref 21–32)
COLOR: YELLOW
CREAT SERPL-MCNC: 1 MG/DL (ref 0.6–1.2)
EKG ATRIAL RATE: 63 BPM
EKG DIAGNOSIS: NORMAL
EKG P AXIS: 58 DEGREES
EKG P-R INTERVAL: 170 MS
EKG Q-T INTERVAL: 384 MS
EKG QRS DURATION: 70 MS
EKG QTC CALCULATION (BAZETT): 392 MS
EKG R AXIS: 30 DEGREES
EKG T AXIS: 56 DEGREES
EKG VENTRICULAR RATE: 63 BPM
EOSINOPHILS ABSOLUTE: 0.1 K/UL (ref 0–0.6)
EOSINOPHILS RELATIVE PERCENT: 2.4 %
ESTIMATED AVERAGE GLUCOSE: 105.4 MG/DL
GFR AFRICAN AMERICAN: >60
GFR NON-AFRICAN AMERICAN: 56
GLUCOSE BLD-MCNC: 95 MG/DL (ref 70–99)
GLUCOSE URINE: NEGATIVE MG/DL
HBA1C MFR BLD: 5.3 %
HCT VFR BLD CALC: 33.9 % (ref 36–48)
HEMOGLOBIN: 11.5 G/DL (ref 12–16)
INR BLD: 0.87 (ref 0.88–1.12)
KETONES, URINE: NEGATIVE MG/DL
LEUKOCYTE ESTERASE, URINE: NEGATIVE
LYMPHOCYTES ABSOLUTE: 1.3 K/UL (ref 1–5.1)
LYMPHOCYTES RELATIVE PERCENT: 28.1 %
MCH RBC QN AUTO: 32 PG (ref 26–34)
MCHC RBC AUTO-ENTMCNC: 34.1 G/DL (ref 31–36)
MCV RBC AUTO: 93.8 FL (ref 80–100)
MICROSCOPIC EXAMINATION: NORMAL
MONOCYTES ABSOLUTE: 0.5 K/UL (ref 0–1.3)
MONOCYTES RELATIVE PERCENT: 10.3 %
MRSA SCREEN RT-PCR: NORMAL
NEUTROPHILS ABSOLUTE: 2.7 K/UL (ref 1.7–7.7)
NEUTROPHILS RELATIVE PERCENT: 58.6 %
NITRITE, URINE: NEGATIVE
PDW BLD-RTO: 16.9 % (ref 12.4–15.4)
PH UA: 7 (ref 5–8)
PLATELET # BLD: 202 K/UL (ref 135–450)
PMV BLD AUTO: 6.7 FL (ref 5–10.5)
POTASSIUM SERPL-SCNC: 5.1 MMOL/L (ref 3.5–5.1)
PREALBUMIN: 28.4 MG/DL (ref 20–40)
PROTEIN UA: NEGATIVE MG/DL
PROTHROMBIN TIME: 9.8 SEC (ref 9.9–12.7)
RBC # BLD: 3.61 M/UL (ref 4–5.2)
SODIUM BLD-SCNC: 132 MMOL/L (ref 136–145)
SPECIFIC GRAVITY UA: 1.02 (ref 1–1.03)
URINE REFLEX TO CULTURE: NORMAL
URINE TYPE: NORMAL
UROBILINOGEN, URINE: 0.2 E.U./DL
VITAMIN D 25-HYDROXY: 55 NG/ML
WBC # BLD: 4.6 K/UL (ref 4–11)

## 2022-05-09 PROCEDURE — 82040 ASSAY OF SERUM ALBUMIN: CPT

## 2022-05-09 PROCEDURE — 80048 BASIC METABOLIC PNL TOTAL CA: CPT

## 2022-05-09 PROCEDURE — 93010 ELECTROCARDIOGRAM REPORT: CPT | Performed by: INTERNAL MEDICINE

## 2022-05-09 PROCEDURE — 83036 HEMOGLOBIN GLYCOSYLATED A1C: CPT

## 2022-05-09 PROCEDURE — 81003 URINALYSIS AUTO W/O SCOPE: CPT

## 2022-05-09 PROCEDURE — 86900 BLOOD TYPING SEROLOGIC ABO: CPT

## 2022-05-09 PROCEDURE — 87641 MR-STAPH DNA AMP PROBE: CPT

## 2022-05-09 PROCEDURE — 85025 COMPLETE CBC W/AUTO DIFF WBC: CPT

## 2022-05-09 PROCEDURE — 93005 ELECTROCARDIOGRAM TRACING: CPT

## 2022-05-09 PROCEDURE — 82306 VITAMIN D 25 HYDROXY: CPT

## 2022-05-09 PROCEDURE — 85730 THROMBOPLASTIN TIME PARTIAL: CPT

## 2022-05-09 PROCEDURE — 86901 BLOOD TYPING SEROLOGIC RH(D): CPT

## 2022-05-09 PROCEDURE — 84134 ASSAY OF PREALBUMIN: CPT

## 2022-05-09 PROCEDURE — 86850 RBC ANTIBODY SCREEN: CPT

## 2022-05-09 PROCEDURE — 85610 PROTHROMBIN TIME: CPT

## 2022-05-09 NOTE — PROGRESS NOTES
Patient attended JET class on 5/9/22. Patient verified surgery for Total hip replacement. Patient received patient information and educational JET folder including the following handouts: jet powerpoint, covid-19 restrictions, ERAS, incentive spirometry including purpose and how to perform, case management contact information, hand hygiene, preventing constipation, home health care agency list, skilled nursing facility list, pre-operative showering techniques and the use of anti-septic 3 days before surgery. Interviews completed by PT, OT, and PAT. Labs and Tests completed as ordered/necessary. Anti-septic bottle given to patient to take home. Patient states no further questions or concerns. Patient provided orthopedic office and nurse navigator contact information. DOS: 5/18/22  Dr Balbuena Keep: Home with Orren Milks ;if applicable. Patient open to any home health care agency  Transportation:[place]  [facility] needs:none  Per Patient Will see/Saw PCP on 5/6/22.    Electronically signed by Mattie Almanza RN on 5/9/2022 at 2:40 PM

## 2022-05-11 ENCOUNTER — TELEPHONE (OUTPATIENT)
Dept: ORTHOPEDICS UNIT | Age: 62
End: 2022-05-11

## 2022-05-11 NOTE — TELEPHONE ENCOUNTER
Patient called stating she will need a rolling walker at discharge, states her current walker is not usable. Noted. Will notify [facility] per patient request when admitted for surgery.

## 2022-05-12 ENCOUNTER — OFFICE VISIT (OUTPATIENT)
Dept: ORTHOPEDIC SURGERY | Age: 62
End: 2022-05-12
Payer: MEDICARE

## 2022-05-12 ENCOUNTER — PREP FOR PROCEDURE (OUTPATIENT)
Dept: ORTHOPEDICS UNIT | Age: 62
End: 2022-05-12

## 2022-05-12 VITALS — BODY MASS INDEX: 25.52 KG/M2 | WEIGHT: 144 LBS | HEIGHT: 63 IN

## 2022-05-12 DIAGNOSIS — M16.52 POST-TRAUMATIC OSTEOARTHRITIS OF LEFT HIP: Primary | ICD-10-CM

## 2022-05-12 PROCEDURE — G8419 CALC BMI OUT NRM PARAM NOF/U: HCPCS | Performed by: ORTHOPAEDIC SURGERY

## 2022-05-12 PROCEDURE — 99214 OFFICE O/P EST MOD 30 MIN: CPT | Performed by: ORTHOPAEDIC SURGERY

## 2022-05-12 PROCEDURE — 3017F COLORECTAL CA SCREEN DOC REV: CPT | Performed by: ORTHOPAEDIC SURGERY

## 2022-05-12 PROCEDURE — 1036F TOBACCO NON-USER: CPT | Performed by: ORTHOPAEDIC SURGERY

## 2022-05-12 PROCEDURE — G8427 DOCREV CUR MEDS BY ELIG CLIN: HCPCS | Performed by: ORTHOPAEDIC SURGERY
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
## 2022-05-13 ENCOUNTER — TELEPHONE (OUTPATIENT)
Dept: FAMILY MEDICINE CLINIC | Age: 62
End: 2022-05-13

## 2022-05-13 RX ORDER — OXYCODONE HYDROCHLORIDE 10 MG/1
10 TABLET ORAL ONCE
Status: CANCELLED | OUTPATIENT
Start: 2022-05-13 | End: 2022-05-13

## 2022-05-13 RX ORDER — TRANEXAMIC ACID 650 1/1
1950 TABLET ORAL ONCE
Status: CANCELLED | OUTPATIENT
Start: 2022-05-13 | End: 2022-05-13

## 2022-05-13 RX ORDER — MELOXICAM 7.5 MG/1
7.5 TABLET ORAL ONCE
Status: CANCELLED | OUTPATIENT
Start: 2022-05-13 | End: 2022-05-13

## 2022-05-13 NOTE — DISCHARGE INSTR - COC
[facility] ([facility]) Continuity of Care Form    Patient Name:  Rc Quezada  : 1960    MRN:  [id number]    Admit date:  (Not on file)  Discharge date:  2022    Code Status Order: Prior  Advance Directives: No    Admitting Physician: Blease Blizzard, MD  PCP: JACINTA Turner - NP    Discharging Nurse: [facility] Unit/Room#: No information available for this encounter. Discharging Unit Phone Number: [phone]    Emergency Contact:        Past Surgical History:  Past Surgical History:   Procedure Laterality Date    ARTHROGRAPHY Left 3/16/2020    LEFT HIP INJECTION performed by Jayna Serrano MD at [facility] Left 10/18/13    left bipolar hip    JOINT REPLACEMENT Left     hip    NOSE SURGERY      WRIST FRACTURE SURGERY Left        Immunization History:   Immunization History   Administered Date(s) Administered    COVID-19, Pfizer Purple top, DILUTE for use, 12+ yrs, 30mcg/0.3mL dose 2021, 2021, 2021       Active Problems:  Active Problems:    * No active hospital problems. *  Resolved Problems:    * No resolved hospital problems. *      Isolation/Infection:       Nurse Assessment:  Last Vital Signs:Ht 5' 3\" (1.6 m)   Wt 145 lb (65.8 kg)   BMI 25.69 kg/m²   Last documented pain score (0-10 scale):    Last Weight:   Wt Readings from Last 1 Encounters:   22 144 lb (65.3 kg)     Mental Status:  oriented and alert     IV Access:  - None    Nursing Mobility/ADLs:  Walking   Assisted  Transfer  Assisted  Bathing  Assisted  Dressing  Assisted  Toileting  Assisted  Feeding  Independent  Med Admin  Independent  Med Delivery   whole    Wound Care Documentation and Therapy:  Keep glued Prineo dressing intact. DO NOT remove. This is waterproof for showering.  Doctor will evaluate wound at office visit 2 weeks after surgery to discuss removal.     Incision 10/18/13 Hip Left;Lateral (Active)   Number of days: 3129        Elimination:  Urinary Catheter: None Colostomy/Ileostomy: No  Continence: Bowel: Yes  Bladder: Yes  Date of Last BM: ***    Intake/Output Summary (Last 24 hours)   No intake or output data in the 24 hours ending 05/13/22 1340  Safety Concerns: At Risk for Falls    Impairments/Disabilities:      None    Nutrition Therapy:  Current Nutrition Therapy: general  Routes of Feeding: Oral  Liquids: No Restrictions  Daily Fluid Restriction: no  Last Modified Barium Swallow with Video (Video Swallowing Test): not done    Treatments at the Time of Hospital Discharge:   Respiratory Treatments:   Oxygen Therapy:  is not on home oxygen therapy.   Ventilator:    - No ventilator support    Lab orders for discharge:        Rehab Therapies: Physical Therapy, Occupational Therapy and nursing care  Weight Bearing Status/Restrictions: No weight bearing restrictions, anterior hip precautions, NO straight leg raises  Other Medical Equipment (for information only, NOT a DME order):  Rolling walker  Other Treatments: ASA 81mg twice at day for 30 days for DVT prophylaxis , bilateral knee high MELISSA hose for 2 weeks after surgery  HOME HEALTH CARE: [facility] [address] [place] [address] to establish plan of care for patient over 60 day period   Nursing  Initial home SN evaluation visit to occur within 24-48 hours for:  medication management  VS and clinical assessment  S&S chronic disease exacerbation education + when to contact MD / NP  care coordination  Medication Reconciliation during 1st SN visit       PT/OT   Evaluate with goal of regaining prior level of functioning   OT to evaluate if patient has [address] needs for personal care      PCP Visit scheduled within 7 days of hospital discharge       Patient's personal belongings (please select all that are sent with patient):  clothing    RN SIGNATURE:  Electronically signed by Meghna Romo RN on 5/18/22 at 10:30 AM EDT    PHYSICIAN SECTION    Prognosis: Good    Condition at Discharge: Stable    Rehab Potential (if transferring to Rehab): Good    Physician Certification: I certify the above orders, information, and transfer of Audelia Mendieta is necessary for the continuing treatment of the diagnosis listed and that he requires [address] for less 30 days. Update Admission H&P: No change in H&P    PHYSICIAN SIGNATURE:  Electronically signed by JACINTA Brito CNP on 5/13/22 at 1:40 PM EDT/ Dr Dominga Hagan in office 2 weeks after surgery   [facility] and Sports Medicine, [facility] Critical access hospital, [address] [place] [address],[address] ,   [phone]    CASE MANAGEMENT/SOCIAL WORK SECTION    Inpatient Status Date: ***    [facility] Readmission Risk Assessment Score:    Discharging to Facility/ Agency   Name:  [facility] care    Address: [address] [facility], [facility]  Phone: [phone]  Fax: [phone]      Dialysis Facility (if applicable)   Name:  Address:  Dialysis Schedule:  Phone:  Fax:    / signature: {Esignature:[id number]}  Activity:  Elevate your leg if swelling occurs in your ankle. Use elastic wraps/hose until swelling decreases. Continue the exercise program as prescribed by physical therapists. Take frequent walks. Use walker, crutches, or cane with weight bearing instructions as indicated by the physical therapists. Take rest periods often. Elevate leg during rest period. Avoid sitting in low chairs or toilets without raised seats. Keep knees apart. Sleep with a pillow between your legs. Do not cross your legs, especially when putting on shoes and socks. Wound Care:  Cover the wound with a sterile gauze dressing and change daily as long as there is drainage. Do not scrub wound. Pat it dry with a soft towel. Dont apply any lotions or creams to your wound. Check the incision every day for redness, swelling, or increase in drainage. Diet:  You can resume your normal diet.   There are no limits on your diet due to your surgery. Pain pills and activity changes may lead to constipation. To prevent this, use prune juice or bran cereals liberally. You may need to use a laxative such as Dulcolax, Senokot, or Milk of Magnesia. Drink plenty of fluids. Medications: Take pain pills if needed to maintain comfort. Never drive while taking pain medicine. Avoid over the counter medications until checking with your doctor. Resume previous medications as instructed by your doctor. You will be on Aspirin or Eliquis  for 30 days only. Stay off other anti-inflammatory medications (except Celebrex) while on blood thinners  Call Your Doctor If:  You have increased pain not controlled by medications. Excessive swelling in your ankle. You develop numbness, tingling, or decreased movement. You have a fever greater than 100 degrees for a day or over 101 degrees at any one time. Your wound becomes more reddened, starts draining, or opens. If you fall. You have any questions about your recovery. Inform your family doctor/dentist or any other doctor who cares for you in the future that you have a joint replacement. You may need antibiotics for dental or surgical procedures if there is any evidence of infection present. If you have required the use of insulin to control your blood sugar after surgery, follow up with your family doctor. Call your surgeons office to schedule your appointment to be seen after surgery. Make your appointment to continue physical therapy per doctors orders.   Smoking cessation assistance can be obtained from your family doctor or by calling [place] @ [phone]    _______________________________   _____   _______________________  ____                Patient Signature              Date      Witness                               Date          Anterior Approach  The main positions and movements to avoid after an anterior approach include bending the hip back, turning your hip and leg out, or spreading your leg outward. No straight leg raises. Don't stretch your hip back. Walk with short steps. Taking a longer step when leading with your nonoperated hip stretches the surgical hip back. Don't kneel only on one knee. Kneeling only on the surgical hip stretches the hip back. Use both knees when you must kneel down. Don't turn your foot out. Place a pillow next to your hip and leg to keep your leg from turning or rolling out while lying on your back in bed. Don't twist your body away from your operated hip. This means don't stand with your toes pointed out. Keep the toes of your affected leg pointed forward when you stand, sit, or walk. If you turn your body away from your surgical hip without pivoting your foot, your hip will be placed in an unsafe position. Remember to lift and turn your foot as you turn. Don't swing your leg outward away from your body. This means scooting to the side in bed by supporting your surgical leg. Don't put your leg in a straddling position, as though you are mounting a horse. This means preventing your leg from bending up and out when getting in or out of the bathtub.  Instead, hold your leg, and lift it straight up and over the edge of the tub

## 2022-05-13 NOTE — TELEPHONE ENCOUNTER
Pt chart is updated and she is cleared for surgery. Please notify pt her Sodium is still low and kidney function shows dehydration, drink Pedialyte daily to help with electrolyte and fluid replacement.

## 2022-05-13 NOTE — TELEPHONE ENCOUNTER
Jean Marie robb/ [facility] is calling about the H & P   Pt is having surgery on the 18th. Need to know if pt is cleared for surgery, it is pending for labs and ekg. Please put in epic.

## 2022-05-17 ENCOUNTER — TELEPHONE (OUTPATIENT)
Dept: ORTHOPEDIC SURGERY | Age: 62
End: 2022-05-17

## 2022-05-17 ENCOUNTER — ANESTHESIA EVENT (OUTPATIENT)
Dept: OPERATING ROOM | Age: 62
End: 2022-05-17
Payer: MEDICARE

## 2022-05-17 NOTE — TELEPHONE ENCOUNTER
General Question     Subject: WILD FROM UTILIZATION REVIEW WITH [facility] HAS QUESTIONS REGARDING PATIENTS PRIOR AUTHORIZATION. UNSURE IF THE CPT IS CORRECT PLEASE ADVISE.  Vicki Baig NUMBER [phone]    Patient Jan Manriquez  Contact Number: [phone]

## 2022-05-18 ENCOUNTER — APPOINTMENT (OUTPATIENT)
Dept: GENERAL RADIOLOGY | Age: 62
End: 2022-05-18
Attending: ORTHOPAEDIC SURGERY
Payer: MEDICARE

## 2022-05-18 ENCOUNTER — ANESTHESIA (OUTPATIENT)
Dept: OPERATING ROOM | Age: 62
End: 2022-05-18
Payer: MEDICARE

## 2022-05-18 ENCOUNTER — HOSPITAL ENCOUNTER (OUTPATIENT)
Age: 62
Setting detail: SURGERY ADMIT
Discharge: HOME | End: 2022-05-18
Attending: ORTHOPAEDIC SURGERY | Admitting: ORTHOPAEDIC SURGERY
Payer: MEDICARE

## 2022-05-18 ENCOUNTER — TELEPHONE (OUTPATIENT)
Dept: ORTHOPEDIC SURGERY | Age: 62
End: 2022-05-18

## 2022-05-18 VITALS
HEART RATE: 70 BPM | BODY MASS INDEX: 25.27 KG/M2 | HEIGHT: 63 IN | OXYGEN SATURATION: 97 % | SYSTOLIC BLOOD PRESSURE: 120 MMHG | TEMPERATURE: 96.8 F | RESPIRATION RATE: 12 BRPM | WEIGHT: 142.64 LBS | DIASTOLIC BLOOD PRESSURE: 70 MMHG

## 2022-05-18 DIAGNOSIS — M25.559 HIP PAIN: Primary | ICD-10-CM

## 2022-05-18 LAB
ABO/RH: NORMAL
ANTIBODY SCREEN: NORMAL
ANTIBODY SCREEN: NORMAL
GLUCOSE BLD-MCNC: 110 MG/DL (ref 70–99)
PERFORMED ON: ABNORMAL

## 2022-05-18 PROCEDURE — 86901 BLOOD TYPING SEROLOGIC RH(D): CPT

## 2022-05-18 PROCEDURE — 6370000000 HC RX 637 (ALT 250 FOR IP): Performed by: ORTHOPAEDIC SURGERY

## 2022-05-18 PROCEDURE — 73501 X-RAY EXAM HIP UNI 1 VIEW: CPT

## 2022-05-18 PROCEDURE — 6360000002 HC RX W HCPCS: Performed by: ANESTHESIOLOGY

## 2022-05-18 PROCEDURE — 7100000000 HC PACU RECOVERY - FIRST 15 MIN: Performed by: ORTHOPAEDIC SURGERY

## 2022-05-18 PROCEDURE — 6360000002 HC RX W HCPCS: Performed by: ORTHOPAEDIC SURGERY

## 2022-05-18 PROCEDURE — 3209999900 FLUORO FOR SURGICAL PROCEDURES

## 2022-05-18 PROCEDURE — 27132 TOTAL HIP ARTHROPLASTY: CPT | Performed by: ORTHOPAEDIC SURGERY

## 2022-05-18 PROCEDURE — 97162 PT EVAL MOD COMPLEX 30 MIN: CPT

## 2022-05-18 PROCEDURE — A4217 STERILE WATER/SALINE, 500 ML: HCPCS | Performed by: ORTHOPAEDIC SURGERY

## 2022-05-18 PROCEDURE — 2580000003 HC RX 258: Performed by: ANESTHESIOLOGY

## 2022-05-18 PROCEDURE — 6360000002 HC RX W HCPCS

## 2022-05-18 PROCEDURE — 3600000015 HC SURGERY LEVEL 5 ADDTL 15MIN: Performed by: ORTHOPAEDIC SURGERY

## 2022-05-18 PROCEDURE — 86900 BLOOD TYPING SEROLOGIC ABO: CPT

## 2022-05-18 PROCEDURE — 3700000000 HC ANESTHESIA ATTENDED CARE: Performed by: ORTHOPAEDIC SURGERY

## 2022-05-18 PROCEDURE — 6370000000 HC RX 637 (ALT 250 FOR IP): Performed by: NURSE PRACTITIONER

## 2022-05-18 PROCEDURE — C1776 JOINT DEVICE (IMPLANTABLE): HCPCS | Performed by: ORTHOPAEDIC SURGERY

## 2022-05-18 PROCEDURE — 2500000003 HC RX 250 WO HCPCS

## 2022-05-18 PROCEDURE — 86850 RBC ANTIBODY SCREEN: CPT

## 2022-05-18 PROCEDURE — 7100000011 HC PHASE II RECOVERY - ADDTL 15 MIN: Performed by: ORTHOPAEDIC SURGERY

## 2022-05-18 PROCEDURE — 97530 THERAPEUTIC ACTIVITIES: CPT

## 2022-05-18 PROCEDURE — 2500000003 HC RX 250 WO HCPCS: Performed by: NURSE PRACTITIONER

## 2022-05-18 PROCEDURE — 7100000001 HC PACU RECOVERY - ADDTL 15 MIN: Performed by: ORTHOPAEDIC SURGERY

## 2022-05-18 PROCEDURE — 97116 GAIT TRAINING THERAPY: CPT

## 2022-05-18 PROCEDURE — 3600000005 HC SURGERY LEVEL 5 BASE: Performed by: ORTHOPAEDIC SURGERY

## 2022-05-18 PROCEDURE — 7100000010 HC PHASE II RECOVERY - FIRST 15 MIN: Performed by: ORTHOPAEDIC SURGERY

## 2022-05-18 PROCEDURE — 97166 OT EVAL MOD COMPLEX 45 MIN: CPT

## 2022-05-18 PROCEDURE — 2709999900 HC NON-CHARGEABLE SUPPLY: Performed by: ORTHOPAEDIC SURGERY

## 2022-05-18 PROCEDURE — 6370000000 HC RX 637 (ALT 250 FOR IP): Performed by: ANESTHESIOLOGY

## 2022-05-18 PROCEDURE — 3700000001 HC ADD 15 MINUTES (ANESTHESIA): Performed by: ORTHOPAEDIC SURGERY

## 2022-05-18 PROCEDURE — 27132 TOTAL HIP ARTHROPLASTY: CPT | Performed by: PHYSICIAN ASSISTANT

## 2022-05-18 PROCEDURE — 97535 SELF CARE MNGMENT TRAINING: CPT

## 2022-05-18 PROCEDURE — 6360000002 HC RX W HCPCS: Performed by: NURSE PRACTITIONER

## 2022-05-18 PROCEDURE — 2720000010 HC SURG SUPPLY STERILE: Performed by: ORTHOPAEDIC SURGERY

## 2022-05-18 PROCEDURE — 2580000003 HC RX 258: Performed by: ORTHOPAEDIC SURGERY

## 2022-05-18 DEVICE — BI MENTUM PFR PE LINER 47MM28MM: Type: IMPLANTABLE DEVICE | Site: HIP | Status: FUNCTIONAL

## 2022-05-18 DEVICE — PINNACLE HIP SOLUTIONS DUAL MOBILITY LINER PINNACLE ACETABULAR SHELL BI-MENTUM PE LINER 54/47 54MM 47/28
Type: IMPLANTABLE DEVICE | Site: HIP | Status: FUNCTIONAL
Brand: PINNACLE HIP SOLUTIONS

## 2022-05-18 DEVICE — PINNACLE GRIPTION ACETABULAR SHELL SECTOR 54MM OD
Type: IMPLANTABLE DEVICE | Site: HIP | Status: FUNCTIONAL
Brand: PINNACLE GRIPTION

## 2022-05-18 DEVICE — COBALT CHROME 12/14 TAPER FEMORAL                                    HEAD 28MM + 4: Type: IMPLANTABLE DEVICE | Site: HIP | Status: FUNCTIONAL

## 2022-05-18 RX ORDER — KETOROLAC TROMETHAMINE 15 MG/ML
15 INJECTION, SOLUTION INTRAMUSCULAR; INTRAVENOUS
Status: CANCELLED | OUTPATIENT
Start: 2022-05-18 | End: 2022-05-18

## 2022-05-18 RX ORDER — SODIUM CHLORIDE 0.9 % (FLUSH) 0.9 %
5-40 SYRINGE (ML) INJECTION PRN
Status: CANCELLED | OUTPATIENT
Start: 2022-05-18

## 2022-05-18 RX ORDER — DEXAMETHASONE SODIUM PHOSPHATE 4 MG/ML
INJECTION, SOLUTION INTRA-ARTICULAR; INTRALESIONAL; INTRAMUSCULAR; INTRAVENOUS; SOFT TISSUE PRN
Status: DISCONTINUED | OUTPATIENT
Start: 2022-05-18 | End: 2022-05-18 | Stop reason: SDUPTHER

## 2022-05-18 RX ORDER — TRANEXAMIC ACID 650 1/1
1950 TABLET ORAL ONCE
Status: COMPLETED | OUTPATIENT
Start: 2022-05-18 | End: 2022-05-18

## 2022-05-18 RX ORDER — SODIUM CHLORIDE 9 MG/ML
INJECTION, SOLUTION INTRAVENOUS CONTINUOUS
Status: DISCONTINUED | OUTPATIENT
Start: 2022-05-18 | End: 2022-05-18 | Stop reason: HOSPADM

## 2022-05-18 RX ORDER — OXYCODONE HYDROCHLORIDE 5 MG/1
5-10 TABLET ORAL EVERY 6 HOURS PRN
Qty: 40 TABLET | Refills: 0 | Status: SHIPPED | OUTPATIENT
Start: 2022-05-18 | End: 2022-05-23

## 2022-05-18 RX ORDER — CEPHALEXIN 500 MG/1
500 CAPSULE ORAL SEE ADMIN INSTRUCTIONS
Qty: 2 CAPSULE | Refills: 0 | Status: SHIPPED | OUTPATIENT
Start: 2022-05-18

## 2022-05-18 RX ORDER — MORPHINE SULFATE 4 MG/ML
4 INJECTION, SOLUTION INTRAMUSCULAR; INTRAVENOUS
Status: CANCELLED | OUTPATIENT
Start: 2022-05-18

## 2022-05-18 RX ORDER — SODIUM CHLORIDE 9 MG/ML
INJECTION, SOLUTION INTRAVENOUS PRN
Status: CANCELLED | OUTPATIENT
Start: 2022-05-18

## 2022-05-18 RX ORDER — ROCURONIUM BROMIDE 10 MG/ML
INJECTION, SOLUTION INTRAVENOUS PRN
Status: DISCONTINUED | OUTPATIENT
Start: 2022-05-18 | End: 2022-05-18 | Stop reason: SDUPTHER

## 2022-05-18 RX ORDER — HALOPERIDOL 5 MG/ML
1 INJECTION INTRAMUSCULAR
Status: DISCONTINUED | OUTPATIENT
Start: 2022-05-18 | End: 2022-05-18 | Stop reason: HOSPADM

## 2022-05-18 RX ORDER — SODIUM CHLORIDE 9 MG/ML
INJECTION, SOLUTION INTRAVENOUS PRN
Status: DISCONTINUED | OUTPATIENT
Start: 2022-05-18 | End: 2022-05-18 | Stop reason: HOSPADM

## 2022-05-18 RX ORDER — LORAZEPAM 2 MG/ML
0.5 INJECTION INTRAMUSCULAR
Status: DISCONTINUED | OUTPATIENT
Start: 2022-05-18 | End: 2022-05-18 | Stop reason: HOSPADM

## 2022-05-18 RX ORDER — OXYCODONE HYDROCHLORIDE 10 MG/1
10 TABLET ORAL ONCE
Status: COMPLETED | OUTPATIENT
Start: 2022-05-18 | End: 2022-05-18

## 2022-05-18 RX ORDER — DIPHENHYDRAMINE HYDROCHLORIDE 50 MG/ML
12.5 INJECTION INTRAMUSCULAR; INTRAVENOUS
Status: DISCONTINUED | OUTPATIENT
Start: 2022-05-18 | End: 2022-05-18 | Stop reason: HOSPADM

## 2022-05-18 RX ORDER — INSULIN LISPRO 100 [IU]/ML
0.08 INJECTION, SOLUTION INTRAVENOUS; SUBCUTANEOUS
Status: CANCELLED | OUTPATIENT
Start: 2022-05-18

## 2022-05-18 RX ORDER — FENTANYL CITRATE 50 UG/ML
50 INJECTION, SOLUTION INTRAMUSCULAR; INTRAVENOUS EVERY 5 MIN PRN
Status: DISCONTINUED | OUTPATIENT
Start: 2022-05-18 | End: 2022-05-18 | Stop reason: HOSPADM

## 2022-05-18 RX ORDER — OXYCODONE HYDROCHLORIDE 10 MG/1
10 TABLET ORAL EVERY 4 HOURS PRN
Status: CANCELLED | OUTPATIENT
Start: 2022-05-18

## 2022-05-18 RX ORDER — ONDANSETRON 2 MG/ML
4 INJECTION INTRAMUSCULAR; INTRAVENOUS
Status: COMPLETED | OUTPATIENT
Start: 2022-05-18 | End: 2022-05-18

## 2022-05-18 RX ORDER — ACETAMINOPHEN 500 MG
1000 TABLET ORAL ONCE
Status: COMPLETED | OUTPATIENT
Start: 2022-05-18 | End: 2022-05-18

## 2022-05-18 RX ORDER — ONDANSETRON 2 MG/ML
4 INJECTION INTRAMUSCULAR; INTRAVENOUS EVERY 6 HOURS PRN
Status: CANCELLED | OUTPATIENT
Start: 2022-05-18

## 2022-05-18 RX ORDER — ASPIRIN 81 MG/1
81 TABLET ORAL 2 TIMES DAILY
Status: CANCELLED | OUTPATIENT
Start: 2022-05-18

## 2022-05-18 RX ORDER — SODIUM CHLORIDE 0.9 % (FLUSH) 0.9 %
5-40 SYRINGE (ML) INJECTION EVERY 12 HOURS SCHEDULED
Status: CANCELLED | OUTPATIENT
Start: 2022-05-18

## 2022-05-18 RX ORDER — INSULIN LISPRO 100 [IU]/ML
0-6 INJECTION, SOLUTION INTRAVENOUS; SUBCUTANEOUS
Status: CANCELLED | OUTPATIENT
Start: 2022-05-18

## 2022-05-18 RX ORDER — ONDANSETRON 2 MG/ML
INJECTION INTRAMUSCULAR; INTRAVENOUS PRN
Status: DISCONTINUED | OUTPATIENT
Start: 2022-05-18 | End: 2022-05-18 | Stop reason: SDUPTHER

## 2022-05-18 RX ORDER — ASPIRIN 81 MG/1
81 TABLET ORAL 2 TIMES DAILY
Qty: 60 TABLET | Refills: 0 | Status: SHIPPED | OUTPATIENT
Start: 2022-05-18 | End: 2022-06-17

## 2022-05-18 RX ORDER — SODIUM CHLORIDE 0.9 % (FLUSH) 0.9 %
5-40 SYRINGE (ML) INJECTION EVERY 12 HOURS SCHEDULED
Status: DISCONTINUED | OUTPATIENT
Start: 2022-05-18 | End: 2022-05-18 | Stop reason: HOSPADM

## 2022-05-18 RX ORDER — ONDANSETRON 4 MG/1
4 TABLET, ORALLY DISINTEGRATING ORAL EVERY 8 HOURS PRN
Status: CANCELLED | OUTPATIENT
Start: 2022-05-18

## 2022-05-18 RX ORDER — PROPOFOL 10 MG/ML
INJECTION, EMULSION INTRAVENOUS PRN
Status: DISCONTINUED | OUTPATIENT
Start: 2022-05-18 | End: 2022-05-18 | Stop reason: SDUPTHER

## 2022-05-18 RX ORDER — MEPERIDINE HYDROCHLORIDE 25 MG/ML
12.5 INJECTION INTRAMUSCULAR; INTRAVENOUS; SUBCUTANEOUS ONCE
Status: DISCONTINUED | OUTPATIENT
Start: 2022-05-18 | End: 2022-05-18 | Stop reason: HOSPADM

## 2022-05-18 RX ORDER — SODIUM CHLORIDE 450 MG/100ML
INJECTION, SOLUTION INTRAVENOUS CONTINUOUS
Status: CANCELLED | OUTPATIENT
Start: 2022-05-18

## 2022-05-18 RX ORDER — ACETAMINOPHEN 325 MG/1
650 TABLET ORAL EVERY 6 HOURS
Status: CANCELLED | OUTPATIENT
Start: 2022-05-18

## 2022-05-18 RX ORDER — INSULIN LISPRO 100 [IU]/ML
0-3 INJECTION, SOLUTION INTRAVENOUS; SUBCUTANEOUS NIGHTLY
Status: CANCELLED | OUTPATIENT
Start: 2022-05-18

## 2022-05-18 RX ORDER — OXYCODONE HYDROCHLORIDE 5 MG/1
5 TABLET ORAL
Status: COMPLETED | OUTPATIENT
Start: 2022-05-18 | End: 2022-05-18

## 2022-05-18 RX ORDER — OXYCODONE HYDROCHLORIDE 5 MG/1
5 TABLET ORAL EVERY 4 HOURS PRN
Status: CANCELLED | OUTPATIENT
Start: 2022-05-18

## 2022-05-18 RX ORDER — DEXTROSE MONOHYDRATE 50 MG/ML
100 INJECTION, SOLUTION INTRAVENOUS PRN
Status: CANCELLED | OUTPATIENT
Start: 2022-05-18

## 2022-05-18 RX ORDER — METHOCARBAMOL 100 MG/ML
INJECTION, SOLUTION INTRAMUSCULAR; INTRAVENOUS PRN
Status: DISCONTINUED | OUTPATIENT
Start: 2022-05-18 | End: 2022-05-18 | Stop reason: SDUPTHER

## 2022-05-18 RX ORDER — SODIUM CHLORIDE 0.9 % (FLUSH) 0.9 %
5-40 SYRINGE (ML) INJECTION PRN
Status: DISCONTINUED | OUTPATIENT
Start: 2022-05-18 | End: 2022-05-18 | Stop reason: HOSPADM

## 2022-05-18 RX ORDER — INSULIN GLARGINE 100 [IU]/ML
0.25 INJECTION, SOLUTION SUBCUTANEOUS NIGHTLY
Status: CANCELLED | OUTPATIENT
Start: 2022-05-18

## 2022-05-18 RX ORDER — MELOXICAM 7.5 MG/1
7.5 TABLET ORAL ONCE
Status: COMPLETED | OUTPATIENT
Start: 2022-05-18 | End: 2022-05-18

## 2022-05-18 RX ORDER — MIDAZOLAM HYDROCHLORIDE 1 MG/ML
INJECTION INTRAMUSCULAR; INTRAVENOUS PRN
Status: DISCONTINUED | OUTPATIENT
Start: 2022-05-18 | End: 2022-05-18 | Stop reason: SDUPTHER

## 2022-05-18 RX ORDER — LIDOCAINE HYDROCHLORIDE 20 MG/ML
INJECTION, SOLUTION EPIDURAL; INFILTRATION; INTRACAUDAL; PERINEURAL PRN
Status: DISCONTINUED | OUTPATIENT
Start: 2022-05-18 | End: 2022-05-18 | Stop reason: SDUPTHER

## 2022-05-18 RX ORDER — FENTANYL CITRATE 50 UG/ML
INJECTION, SOLUTION INTRAMUSCULAR; INTRAVENOUS PRN
Status: DISCONTINUED | OUTPATIENT
Start: 2022-05-18 | End: 2022-05-18 | Stop reason: SDUPTHER

## 2022-05-18 RX ORDER — MORPHINE SULFATE 2 MG/ML
2 INJECTION, SOLUTION INTRAMUSCULAR; INTRAVENOUS
Status: CANCELLED | OUTPATIENT
Start: 2022-05-18

## 2022-05-18 RX ADMIN — HYDROMORPHONE HYDROCHLORIDE 0.5 MG: 1 INJECTION, SOLUTION INTRAMUSCULAR; INTRAVENOUS; SUBCUTANEOUS at 08:01

## 2022-05-18 RX ADMIN — FENTANYL CITRATE 50 MCG: 50 INJECTION INTRAMUSCULAR; INTRAVENOUS at 07:30

## 2022-05-18 RX ADMIN — ONDANSETRON 4 MG: 2 INJECTION INTRAMUSCULAR; INTRAVENOUS at 08:30

## 2022-05-18 RX ADMIN — Medication: at 15:00

## 2022-05-18 RX ADMIN — OXYCODONE 5 MG: 5 TABLET ORAL at 13:34

## 2022-05-18 RX ADMIN — ACETAMINOPHEN 1000 MG: 500 TABLET ORAL at 13:40

## 2022-05-18 RX ADMIN — HYDROMORPHONE HYDROCHLORIDE 0.25 MG: 1 INJECTION, SOLUTION INTRAMUSCULAR; INTRAVENOUS; SUBCUTANEOUS at 09:34

## 2022-05-18 RX ADMIN — CEFAZOLIN 2000 MG: 10 INJECTION, POWDER, FOR SOLUTION INTRAVENOUS at 07:36

## 2022-05-18 RX ADMIN — ROCURONIUM BROMIDE 50 MG: 10 INJECTION INTRAVENOUS at 07:31

## 2022-05-18 RX ADMIN — MIDAZOLAM 2 MG: 1 INJECTION INTRAMUSCULAR; INTRAVENOUS at 07:28

## 2022-05-18 RX ADMIN — SODIUM CHLORIDE: 9 INJECTION, SOLUTION INTRAVENOUS at 07:28

## 2022-05-18 RX ADMIN — TRANEXAMIC ACID 1950 MG: 650 TABLET ORAL at 06:25

## 2022-05-18 RX ADMIN — CEFAZOLIN 2000 MG: 10 INJECTION, POWDER, FOR SOLUTION INTRAVENOUS at 13:41

## 2022-05-18 RX ADMIN — LIDOCAINE HYDROCHLORIDE 100 MG: 20 INJECTION, SOLUTION EPIDURAL; INFILTRATION; INTRACAUDAL; PERINEURAL at 07:31

## 2022-05-18 RX ADMIN — OXYCODONE HYDROCHLORIDE 10 MG: 10 TABLET ORAL at 06:25

## 2022-05-18 RX ADMIN — METHOCARBAMOL 1000 MG: 100 INJECTION, SOLUTION INTRAMUSCULAR; INTRAVENOUS at 07:54

## 2022-05-18 RX ADMIN — PROPOFOL 150 MG: 10 INJECTION, EMULSION INTRAVENOUS at 07:31

## 2022-05-18 RX ADMIN — FENTANYL CITRATE 50 MCG: 50 INJECTION INTRAMUSCULAR; INTRAVENOUS at 07:48

## 2022-05-18 RX ADMIN — MELOXICAM 7.5 MG: 7.5 TABLET ORAL at 06:25

## 2022-05-18 RX ADMIN — SUGAMMADEX 200 MG: 100 INJECTION, SOLUTION INTRAVENOUS at 08:39

## 2022-05-18 RX ADMIN — DEXAMETHASONE SODIUM PHOSPHATE 10 MG: 4 INJECTION, SOLUTION INTRAMUSCULAR; INTRAVENOUS at 07:40

## 2022-05-18 RX ADMIN — HYDROMORPHONE HYDROCHLORIDE 0.5 MG: 1 INJECTION, SOLUTION INTRAMUSCULAR; INTRAVENOUS; SUBCUTANEOUS at 08:24

## 2022-05-18 RX ADMIN — ONDANSETRON 4 MG: 2 INJECTION INTRAMUSCULAR; INTRAVENOUS at 12:35

## 2022-05-18 ASSESSMENT — PAIN DESCRIPTION - ONSET: ONSET: ON-GOING

## 2022-05-18 ASSESSMENT — PAIN DESCRIPTION - ORIENTATION
ORIENTATION: ANTERIOR;LEFT
ORIENTATION: LEFT;ANTERIOR
ORIENTATION: ANTERIOR;LEFT
ORIENTATION: LEFT;ANTERIOR
ORIENTATION: ANTERIOR;LEFT
ORIENTATION: LEFT
ORIENTATION: LEFT

## 2022-05-18 ASSESSMENT — LIFESTYLE VARIABLES: SMOKING_STATUS: 0

## 2022-05-18 ASSESSMENT — PAIN DESCRIPTION - DESCRIPTORS
DESCRIPTORS: SORE
DESCRIPTORS: ACHING
DESCRIPTORS: SORE
DESCRIPTORS: ACHING

## 2022-05-18 ASSESSMENT — PAIN DESCRIPTION - LOCATION
LOCATION: HIP

## 2022-05-18 ASSESSMENT — PAIN DESCRIPTION - PAIN TYPE
TYPE: SURGICAL PAIN

## 2022-05-18 ASSESSMENT — PAIN - FUNCTIONAL ASSESSMENT
PAIN_FUNCTIONAL_ASSESSMENT: 0-10
PAIN_FUNCTIONAL_ASSESSMENT: ACTIVITIES ARE NOT PREVENTED
PAIN_FUNCTIONAL_ASSESSMENT: PREVENTS OR INTERFERES SOME ACTIVE ACTIVITIES AND ADLS

## 2022-05-18 ASSESSMENT — ENCOUNTER SYMPTOMS: SHORTNESS OF BREATH: 0

## 2022-05-18 ASSESSMENT — PAIN SCALES - GENERAL
PAINLEVEL_OUTOF10: 4
PAINLEVEL_OUTOF10: 5
PAINLEVEL_OUTOF10: 3
PAINLEVEL_OUTOF10: 4
PAINLEVEL_OUTOF10: 4
PAINLEVEL_OUTOF10: 5
PAINLEVEL_OUTOF10: 4
PAINLEVEL_OUTOF10: 4

## 2022-05-18 ASSESSMENT — PAIN DESCRIPTION - FREQUENCY: FREQUENCY: CONTINUOUS

## 2022-05-18 NOTE — PLAN OF CARE
Problem: Discharge Planning  Goal: Discharge to home or other facility with appropriate resources  Outcome: Completed   Patient educated on discharge plan and assessed to determine that needs are being met. Questions answered for patient. Patient encouraged to ask questions and voice concerns/comments in regards to barriers for discharge and any other questions about the plan of care. Problem: Chronic Conditions and Co-morbidities  Goal: Patient's chronic conditions and co-morbidity symptoms are monitored and maintained or improved  Outcome: Completed   Chronic conditions addressed, stable at this time. Problem: Skin/Tissue Integrity - Adult  Goal: Incisions, wounds, or drain sites healing without S/S of infection  Outcome: Completed   Surgical incision is intact covered with gauze and tape to distal site due to pinpoint drainage site noted, no redness at site. Problem: Musculoskeletal - Adult  Goal: Return mobility to safest level of function  Outcome: Completed  patient is able to ambulate in room and to bathroom with walker and x1 assistance. Goal: Maintain proper alignment of affected body part  Outcome: Completed  Goal: Return ADL status to a safe level of function  Outcome: Completed     Problem: Gastrointestinal - Adult  Goal: Minimal or absence of nausea and vomiting  Outcome: Completed     Problem: Infection - Adult  Goal: Absence of infection at discharge  Outcome: Completed  Goal: Absence of infection during hospitalization  Outcome: Completed     Problem: Metabolic/Fluid and Electrolytes - Adult  Goal: Glucose maintained within prescribed range  Outcome: Completed   Glucose level within parameters of insulin protocol. Problem: Depression  Goal: Will be euthymic at discharge  Description: INTERVENTIONS:  1. Administer medication as ordered  2. Provide emotional support via 1:1 interaction with staff  3. Encourage involvement in milieu/groups/activities  4.  Monitor for social isolation  Outcome: Completed   Mood is calm and cooperative. Problem: Pain  Goal: Verbalizes/displays adequate comfort level or baseline comfort level  Outcome: Completed   Pt assessed for pain. Pt in pain and assessed with 0-10 pain rating scale. Pt given prescribed analgesic for pain. (See eMar) Pt satisfied with pain relief thus far.        Electronically signed by Qi Ruby RN on 5/18/2022 at 2:49 PM

## 2022-05-18 NOTE — H&P
Update History & Physical    The patient's History and Physical of May 6, 2022 was reviewed with the patient and I examined the patient. There was no change. The surgical site was confirmed by the patient and me. Plan: The risks, benefits, expected outcome, and alternative to the recommended procedure have been discussed with the patient. Patient understands and wants to proceed with the procedure.      Electronically signed by Gurvinder Mccauley MD on 5/18/2022 at 7:22 AM

## 2022-05-18 NOTE — CARE COORDINATION
[facility]    DC order noted, all docs [facility].     Home care to see patient within 24-48 hrs    Alber Rogers RN, BSN CTN  [facility] [phone]
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
## 2022-05-18 NOTE — PROGRESS NOTES
Notified cat MANNING, keaton VELA, gail , and zohra with [facility] of same day discharge.   Electronically signed by Sue Garcia RN on 5/18/2022 at 10:24 AM

## 2022-05-18 NOTE — OP NOTE
Patient: Tiffanie Matta  YOB: 1960  MRN: [id number]    Date of Procedure: 5/18/2022      Pre-Op Diagnosis: History of left hip hemiarthroplasty with progression of acetabular arthritis     Post-Op Diagnosis: Same       Procedure Performed: Conversion of prior hip surgery to left total hip arthroplasty     Surgeon: Cammy Toney MD     Physician Assistant: AURY Chauhan     Anesthesia: General     Estimated Blood Loss: 905 mL      Complications: None     Specimens: None    Implants:  Depuy [place] Gription cup, 54 mm  Dual mobility liner 54/47  Bipolar head: [employer] 28 + 4 mm metal inner head, Depuy 28/47 PE outer head    Indications: This is a 62 y.o. female who sustained a displaced left femoral neck fracture and had subsequent anterolateral hemiarthroplasty in 2013. She presents with progression of left hip arthritis that continues to be painful despite conservative management. We discussed the diagnosis and treatment options and I recommended conversion to total hip arthroplasty. The operative procedure, alternatives, and risks were discussed in detail with the patient. The risks include but are not limited to: Infection, vessel injury, nerve injury, DVT, pulmonary embolism, implant loosening, need for revision surgery, leg length discrepancy, dislocation, lateral femoral cutaneous nerve palsy, intraoperative fracture. Informed consent for surgery was signed by the patient. Details: The patient was seen in the preoperative holding area where the site of surgery was marked and informed consent was confirmed. The patient was brought back to the operating room by OR personnel. Anesthesia was administered. The patient was positioned supine on the [place] table. The left lower extremity was then prepped and draped in a standard and sterile fashion. A final and formal timeout was then performed which confirmed the correct patient, correct position, and correct site of surgery.  IV antibiotics were administered within 1 hour of the skin incision. Fluoroscopy demonstrated that the left leg started out 5 mm longer than the contralateral leg. A direct anterior supine approach was utilized. The skin and subcutaneous tissue were dissected down to the body of the tensor fascia sean. Incision into the fascia of the TFL was made and dissection was carried medial to the tensor and lateral to the rectus femoris and sartorius. The anterior femoral circumflex vessels were identified and coagulated. The anterior capsule of the hip was exposed and an anterior capsulotomy was made. The femoral neck was exposed by two cobra retractors. The bipolar head was dislocated and disengaged from the trunnion of the hip stem. The acetabulum was exposed and debrided of labral and capsular structures. Osteophytes and other acetabular debris were removed. The acetabulum was reamed under direct fluroscopic evaluation. A press fit 54 mm cup was impacted into place in the appropriate abduction and anteversion. This was checked with the C arm and was found to be in satisfacfory position. There were no screws used to secure the fixation of the implant to the floor of the acetabulum. A metal dual mobility liner was placed. The proximal femur was exposed by using the hook from the [place] table, externally rotating, extending, and adducting the leg. The hip was reduced after placing a trial bipolar head over the Kathern Bellow taper of the hip stem. The C arm was brought in to confirm satisfactory hardware placement and leg length equality. The left leg remained 4-5 mm longer than the contralateral leg. The hip was dislocated and the trial implants were removed. The bipolar head segment (28 + 4 mm ID, 47 mm OD) was placed over the Kathern Bellow taper. The hip was reduced. The C arm was brought in and confirmed satisfactory postion of the implants and leg length equity. The wound was irrigated and hemostasis was obtained.  The wound was injected with local anesthetic. The wound was bathed with betadine. The wound was closed in layers, a dressing was applied, and the patient was brought to recovery in satisfactory condition. AURY Dietrich was essential in patient positioning, surgical assistance during the arthroplasty, and in wound closure.     Brittni Estevez MD  5/18/2022

## 2022-05-18 NOTE — PROGRESS NOTES
Yuliet performed this morning including Nurse navigator Chris, Physical therapist estrada, and Occupational therapist keaton. Discussed plan of care, discharge plan, and dme needs if applicable for orthopedic total joint patient.     Electronically signed by Kleber Michele RN on 5/18/2022 at 2:37 PM

## 2022-05-18 NOTE — PROGRESS NOTES
Data- discharge order received, patient verbalized agreement to discharge, disposition to previous residence, needs noted for HHC/DME and informed Spenser Boyle NP. Action- discharge instructions prepared and given to patient, patient verbalized understanding. States her friend Jordyn Akins will be picking her up and staying with her. Medication information packet given r/t NEW and/or CHANGED prescriptions emphasizing name/purpose/side effects, pt verbalized understanding. Discharge instruction summary: Diet- general, Activity- wbat, Primary Care Physician as follows: JACINTA Cali - [phone]. f/u appointment with orthopedic office noted below, immunizations reviewed and discussed with patient, prescription medications to be filled by retail pharmacy and then delivered. Inpatient surgical procedure precautions reviewed: anterior hip precautions. Neurovascular check performed and patient is WNLs, denies numbness/tingling in extremties. Removed pressure dressing and noted one pinpoint spot of scant bloody drainage at incision site and covered with gauze and tape. Notified Jessy Pierce NP and txa ordered. no signs of redness, or odor noted. patient's bedside RN Bryce Gordon notified of patient completing discharge instructions. incentive spirometer provided to patient and educated on purpose and use, patient verbalized understanding. Friend sylvester to receive copy of discharge instructions. Response-  Medications to be delivered to patient via meds to bed program. Disposition is home with Tomas Mejia (DME to be delivered to patient by Carley with [facility]), to be transported by sylvester, no complications.      Future Appointments   Date Time Provider Elvis Lovett   6/2/2022 11:00 [facility]     Electronically signed by Rodrigo Villarreal RN on 5/18/2022 at 2:42 PM

## 2022-05-18 NOTE — PROGRESS NOTES
Occupational Therapy  Facility/Department: [facility] OR  Occupational Therapy Initial Assessment    Name: Silvia Doshi  : 1960  MRN: [id number]  Date of Service: 2022    Discharge Recommendations:  2-3 sessions per week,Patient would benefit from continued therapy after discharge,Home with Home health OT,24 hour supervision or assist  OT Equipment Recommendations  Equipment Needed: No     Silvia Doshi scored a 19/24 on the AM-PAC ADL Inpatient form. Current research shows that an AM-PAC score of 18 or greater is typically associated with a discharge to the patient's home setting. Based on the patient's AM-PAC score, and their current ADL deficits, it is recommended that the patient have 2-3 sessions per week of Occupational Therapy at d/c to increase the patient's independence. At this time, this patient demonstrates the endurance and safety to discharge home with [address] [place] [address] (home vs OP services) and a follow up treatment frequency of 2-3x/wk. Please see assessment section for further patient specific details. If patient discharges prior to next session this note will serve as a discharge summary. Please see below for the latest assessment towards goals. Patient Diagnosis(es): The encounter diagnosis was Hip pain. Past Medical History:  has a past medical history of Brachial neuritis, Carpal tunnel syndrome, Chronic back pain, Depression, Disc disease of the neck, Esophageal reflux, Hip fracture, left ([address] and Osteoarthritis. Past Surgical History:  has a past surgical history that includes Nose surgery; hip surgery (Left, 10/18/13); joint replacement (Left, ); arthrography (Left, 3/16/2020); and Wrist fracture surgery (Left). Treatment Diagnosis: impaired ADL/fxl mobility    Assessment   Performance deficits / Impairments: Decreased functional mobility ; Decreased ADL status; Decreased high-level IADLs;Decreased safe awareness;Decreased balance  Assessment: 63 yo female seen in PACU after L hip hemiathroplasty revised to GUILHERME. PMH: depression, chronic back pain, L hip hemiarthoplasty 2013. PTA, pt lives with family and fully IND no AD. Today, pt functioning below baseline most limited by decreased safety awareness and nausea. Pt educated on anterior hip precautions (significantly d/t mildly impulsive), LB dressing techniques (does not required LB AE d/t ability to maintain precautions), kvng hose wear/dressing, seated bathing/dressing, sleep positioning, and slowing fxl mobility for optimal recovery. Pt completes fxl tx, fxl moblity RW, sink side grooming, toileting, and donning pants with CGA. Pt does require Max A kvng hose. Cont acute OT if admitted to address above deficits. Rec OT 2-3x/week to facilitate safe return home with friend for 24 hr SUP  Treatment Diagnosis: impaired ADL/fxl mobility  Prognosis: Good;Fair  Decision Making: Medium Complexity  REQUIRES OT FOLLOW-UP: Yes  Activity Tolerance  Activity Tolerance: Patient Tolerated treatment well  Activity Tolerance Comments: nausea        Plan   Plan  Times per Week: 1-2 sessions if admitted  Current Treatment Recommendations: Strengthening,ROM,Balance training,Functional mobility training,Endurance training,Pain management,Safety education & training,Patient/Caregiver education & training,Positioning,Self-Care / ADL,Home management training     Restrictions  Restrictions/Precautions  Restrictions/Precautions: Weight Bearing,Fall Risk  Lower Extremity Weight Bearing Restrictions  Left Lower Extremity Weight Bearing: Weight Bearing As Tolerated    Subjective   General  Chart Reviewed: Yes  Patient assessed for rehabilitation services?: Yes  Additional Pertinent Hx: 61 yo female seen in PACU after L hip hemiathroplasty revised to GUILHERME.  PMH: depression, chronic back pain, L hip hemiarthoplasty 2013  Family / Caregiver Present: No  Referring Practitioner: Janessa Baldwin MD  Diagnosis: L GUILHERME  Subjective  Subjective: Pt resting in recliner upon arrival guard assistance (CGA donning pants (no AD required, able to maintain precautions). Max A kvng hose)  Toileting: Contact guard assistance        Bed mobility  Bed Mobility Comments: BINA- in recliner at start and EOS  Transfers  Sit to stand: Contact guard assistance  Stand to sit: Contact guard assistance  Transfer Comments: RW. cues throughout for hand placement to/from recliner and toilet with grab bar     Cognition  Overall Cognitive Status: WFL (mildy impulsive)                  Education Provided: Role of Therapy;Plan of Care;Transfer Training;ADL Adaptive Strategies;Precautions  Education Provided Comments: Pt educated on anterior hip precautions (significantly d/t mildly impulsive), LB dressing techniques (does not required LB AE d/t ability to maintain precautions), kvng hose wear/dressing, seated bathing/dressing, sleep positioning, and slowing fxl mobility for optimal recovery                      AM-PAC Score        [facility] Inpatient Daily Activity Raw Score: 19 (05/18/22 1612)  AM-PAC Inpatient ADL T-Scale Score : 40.22 (05/18/22 1612)  ADL Inpatient CMS 0-100% Score: 42.8 (05/18/22 1612)  ADL Inpatient CMS G-Code Modifier : CK (05/18/22 1612)    Goals  Short Term Goals  Time Frame for Short term goals: prior to d/c  Short Term Goal 1: toileting SUP  Short Term Goal 2: LB dressing SUP  Short Term Goal 3: UB dressing seated set up  Short Term Goal 4: tolerate 5 min fxl standing task SUP  Short Term Goal 5: fxl tx and mobility with RW hosuehold distances SUP  Patient Goals   Patient goals : return home and start home therapy       Therapy Time   Individual Concurrent Group Co-treatment   Time In 1450         Time Out 1530         Minutes 40         Timed Code Treatment Minutes: 25 Minutes (15  eval. 15 ADL.  10 TA)       Anaya Hernadez, OTMEGHNA, OTR/L

## 2022-05-18 NOTE — PROGRESS NOTES
Ambul to BR using walker. Gait steady , gus well. Voided per BR without difficulty.  Nausea after ambul, small emesis and states feels better

## 2022-05-18 NOTE — CONSULTS
[facility] Orthopedic Surgery   Progress Note      S/P :  SUBJECTIVE  In Phase 2. Walked to BR, Voids qs. Steady on feet. Moves a bit fast, explained to pt needs to move slowly. Instructed pt NO TWISTING at waist. . Pain is   described in left hip and with the intensity of mild. Pain is described as aching. Mild nausea, passed after seated for a minute      OBJECTIVE              Physical                      VITALS:  /70   Pulse 70   Temp 96.8 °F (36 °C) (Temporal)   Resp 12   Ht 5' 3\" (1.6 m)   Wt 142 lb 10.2 oz (64.7 kg)   SpO2 97%   BMI 25.27 kg/m²                     MUSCULOSKELETAL:  left foot NVI. Wiggles toes to command. Able to plantarflex and dorsiflex ankle Pedal pulses are palpable. NEUROLOGIC:                                  Sensory:  Touch:  Left Lower Extremity:  normal                                                 Surgical wound appears saturated with thin red drainage. Dressing removed to reveal intact Prineo dressing. Wound well approximated I covered Prineo with 2 ABD pads then placed I ntact Kerlix roll over left hip wound and secured firmly with ACE for pressure dressing.  Discussed with Paco Martines RN and she will remove pressure dressing about 2pm today for further eval of drainage    Data       CBC:   Lab Results   Component Value Date    WBC 4.6 05/09/2022    RBC 3.61 05/09/2022    HGB 11.5 05/09/2022    HCT 33.9 05/09/2022    MCV 93.8 05/09/2022    MCH 32.0 05/09/2022    MCHC 34.1 05/09/2022    RDW 16.9 05/09/2022     05/09/2022    MPV 6.7 05/09/2022        WBC:    Lab Results   Component Value Date    WBC 4.6 05/09/2022        Hemoglobin/Hematocrit:    Lab Results   Component Value Date    HGB 11.5 05/09/2022    HCT 33.9 05/09/2022        PT/INR:    Lab Results   Component Value Date    PROTIME 9.8 05/09/2022    INR 0.87 05/09/2022              Current Inpatient Medications             Current Facility-Administered Medications: sodium chloride flush 0.9 % injection 5-40 mL, 5-40 mL, IntraVENous, 2 times per day  sodium chloride flush 0.9 % injection 5-40 mL, 5-40 mL, IntraVENous, PRN  0.9 % sodium chloride infusion, , IntraVENous, PRN  meperidine (DEMEROL) injection 12.5 mg, 12.5 mg, IntraVENous, Once  HYDROmorphone (DILAUDID) injection 0.25 mg, 0.25 mg, IntraVENous, Q5 Min PRN  fentaNYL (SUBLIMAZE) injection 50 mcg, 50 mcg, IntraVENous, Q5 Min PRN  oxyCODONE (ROXICODONE) immediate release tablet 5 mg, 5 mg, Oral, Once PRN  haloperidol lactate (HALDOL) injection 1 mg, 1 mg, IntraVENous, Once PRN  LORazepam (ATIVAN) injection 0.5 mg, 0.5 mg, IntraVENous, Once PRN  diphenhydrAMINE (BENADRYL) injection 12.5 mg, 12.5 mg, IntraVENous, Once PRN  ceFAZolin (ANCEF) 2000 mg in dextrose 5 % 100 mL IVPB, 2,000 mg, IntraVENous, Once  acetaminophen (TYLENOL) tablet 1,000 mg, 1,000 mg, Oral, Once    ASSESSMENT AND PLAN    Post left GUILHERME revision per Dr Aiken March, stable exam  Left hip moderate drainage postop. Applied pressure dressing .  Remove by nurse navigator about 2pm today to eval  DVT prophylaxis ordered, ASA 81mg twice at day for 30 days for DVT prophylaxis  PT OT for ADL's and ambulation as tolerated, anterior hip precautions  SS for DC planning, home with home care today if drainage subsides  IV or PO pain med as ordered,   Keflex x2 doses after DC to complete antibiotic prophylaxis      JACINTA aCrreon - CNP  5/18/2022  12:37 PM

## 2022-05-18 NOTE — PROGRESS NOTES
Physical Therapy  Facility/Department: [facility] OR  Physical Therapy Initial Assessment    Name: Marie Sinclair  : 1960  MRN: [id number]  Date of Service: 2022    Assessment / Discharge Recommendations:  -managing safely with rolling walker at SBA   -will have a friend transport her home and assist up several steps using one rail and close cga  -instructed in initial HEP and general activity to do until Home PT takes charge of care starting tomorrow  -[employer] issued rolling walker for home use   -she is following anterior hip precautions and was given written copy also        Patient Diagnosis(es): The encounter diagnosis was Hip pain. Past Medical History:  has a past medical history of Brachial neuritis, Carpal tunnel syndrome, Chronic back pain, Depression, Disc disease of the neck, Esophageal reflux, Hip fracture, left ([address] and Osteoarthritis. Past Surgical History:  has a past surgical history that includes Nose surgery; hip surgery (Left, 10/18/13); joint replacement (Left, ); arthrography (Left, 3/16/2020); and Wrist fracture surgery (Left). Body Structures, Functions, Activity Limitations Requiring Skilled Therapeutic Intervention: Decreased functional mobility ; Decreased ADL status; Decreased ROM; Decreased balance  Therapy Prognosis: Good  Decision Making: Medium Complexity  Requires PT Follow-Up:  (at home)  Activity Tolerance  Activity Tolerance: Patient tolerated treatment well     Plan   Plan  Plan Comment: TBD by HomePT  Safety Devices  Type of Devices: Nurse notified,Left in chair,Patient at risk for falls,Call light within reach,Gait belt     Restrictions  Restrictions/Precautions  Restrictions/Precautions: Weight Bearing,Fall Risk  Lower Extremity Weight Bearing Restrictions  Left Lower Extremity Weight Bearing: Weight Bearing As Tolerated     Subjective   General  Chart Reviewed: Yes  Patient assessed for rehabilitation services?: Yes  Additional Pertinent Hx: here for elective left THRevision  Response To Previous Treatment: Not applicable  Family / Caregiver Present: No  Follows Commands: Within Functional Limits  Subjective  Subjective: to room along with OT to patient resting in recliner - she is alert oriented and agreeable to PTOT session and to assess readiness for discharge to home today         Social/Functional History  Social/Functional History  Lives With: Family  Type of Home: Mobile home  Home Layout: One level,Able to Live on Main level with bedroom/bathroom  Home Access: Stairs to enter with rails  Entrance Stairs - Number of Steps: 3 steps to enter with left rail  Entrance Stairs - Rails: Left  Bathroom Shower/Tub: Tub/Shower unit,Shower chair with back,Walk-in shower  Bathroom Toilet: Standard  Bathroom Equipment: Grab bars in shower,Hand-held shower,Grab bars around toilet  Bathroom Accessibility: Accessible  Home Equipment: [place] Pali  Has the patient had two or more falls in the past year or any fall with injury in the past year?: No  ADL Assistance: Independent  Homemaking Assistance: Independent  Ambulation Assistance: Independent  Transfer Assistance: Independent  Additional Comments: Here for revision of Left THR: Reports Right hip: \"good\"  Vision/Hearing  Hearing: Within functional limits    Cognition   Orientation  Overall Orientation Status: Within Functional Limits  Cognition  Overall Cognitive Status: WFL     Objective   Observation/Palpation  Observation: DALE Adrian dressing  Gross Assessment  Tone: Normal  Sensation: Intact   Bed mobility  Bed Mobility Comments: BINA- in recliner at start and EOS  Transfers  Sit to Stand: Stand by assistance  Stand to sit: Stand by assistance  Ambulation  Surface: level tile  Device: Rolling Walker  Assistance: Stand by assistance  Quality of Gait: step to progressing to step through pattern with good heel contact and good weight bearing -adequate base of support  Distance: in hallway to bathroom in PACU phase II for ~70 feet  Comments: steady on the walker  Stairs/Curb  Stairs?: No  Balance  Comments: steady with transfers and ambulation  Goals  Short Term Goals  Time Frame for Short term goals: today for home  Short term goal 1: safe and effective mobility with transfers and ambulation as needed for household distances  with rolling walker  Patient Goals   Patient goals : relief of left hip pain and good function       Education  Patient Education  Education Given To: Patient  Education Provided: Role of Therapy;Plan of Care;Precautions; Home Exercise Program  Education Method: Demonstration;Verbal  Barriers to Learning: None  Education Outcome: Continued education needed      Therapy Time   Individual Concurrent Group Co-treatment   Time In 1450         Time Out 1530         Minutes [address] [facility] Evette Thompson, PT

## 2022-05-18 NOTE — TELEPHONE ENCOUNTER
From Shirlene:  [10:35 AM] Manny Marroquin  if criteria isn't meeting for the [id number] and it most likely won't meet for [phone] for inpatient. we already have auth. we don't have time to get auth for the change. just go forward with the auth as is.   she's just observation/outpatient.

## 2022-05-18 NOTE — TELEPHONE ENCOUNTER
Jaz Suazo from utilization review called concerned about the [phone] code submitted to ins company for [facility] changed it to [phone] and message was sent to Lizbeth Sue informed we are changing the code

## 2022-05-19 ENCOUNTER — TELEPHONE (OUTPATIENT)
Dept: ORTHOPEDIC SURGERY | Age: 62
End: 2022-05-19

## 2022-05-23 ENCOUNTER — TELEPHONE (OUTPATIENT)
Dept: ORTHOPEDICS UNIT | Age: 62
End: 2022-05-23

## 2022-05-23 NOTE — TELEPHONE ENCOUNTER
Spoke with patient regarding post discharge from hospital.    Incision status: No drainage, odor, or redness noted per patient    Edema/Swelling/Teds: edema noted \"still there\", wearing teds    Pain level and status: 2/10 \"i'm fine\"     Use of pain medications: yes ; Patient stated they are taking their pain medication oxycodone as prescribed. Use of ice therapy: yes     Blood thinner: aspirin ; Verified with patient that they are taking their anticoagulant as prescribed twice a day. Bowels: constipation noted, states she is already taking laxative today, passing flatus, last bm 3 days ago, educated patient to increase fluid and fiber intake, educated patient to call us if she does not have a bm by tomorrow, patient verbalized understanding. Home Care Agency active: yes ; Claims already worked with home therapists  . Outpatient therapy: yes    Do you have all of your medications: yes    Changes in medications: no    No other questions/concerns at this time. Encouraged patient to call Orthopedic Nurse Navigator Artist Reasons or Orthopedic office if has any questions/concerns.       Follow up appointments:    Future Appointments   Date Time Provider Elvis Lovett   6/2/2022 11:00 AM MD Orlin Shaw     Electronically signed by Jared Carrion RN on 5/23/2022 at 3:37 PM

## 2022-05-31 ENCOUNTER — TELEPHONE (OUTPATIENT)
Dept: ORTHOPEDICS UNIT | Age: 62
End: 2022-05-31

## 2022-05-31 NOTE — TELEPHONE ENCOUNTER
Patient called and reports a rash around incision, states is extremely itchy. Has been taking benadryl, using ice, and applied Eucerin cream on skin around incision but not over surgical glue. Also reports an allergy to nickel. Patient asking if something can be called in for itching.

## 2022-06-02 ENCOUNTER — OFFICE VISIT (OUTPATIENT)
Dept: ORTHOPEDIC SURGERY | Age: 62
End: 2022-06-02

## 2022-06-02 VITALS — BODY MASS INDEX: 25.16 KG/M2 | HEIGHT: 63 IN | WEIGHT: 142 LBS

## 2022-06-02 DIAGNOSIS — Z96.642 H/O TOTAL HIP ARTHROPLASTY, LEFT: Primary | ICD-10-CM

## 2022-06-02 PROCEDURE — 99024 POSTOP FOLLOW-UP VISIT: CPT | Performed by: ORTHOPAEDIC SURGERY

## 2022-06-02 RX ORDER — OXYCODONE HYDROCHLORIDE 5 MG/1
5 TABLET ORAL EVERY 6 HOURS PRN
Qty: 28 TABLET | Refills: 0 | Status: SHIPPED | OUTPATIENT
Start: 2022-06-02 | End: 2022-06-13 | Stop reason: SDUPTHER

## 2022-06-02 NOTE — PROGRESS NOTES
[facility]  Office Visit    Chief Complaint: Follow-up s/p conversion to left total hip arthroplasty    HPI:  Rishabh Bruce is a 62 y.o. who is here in follow-up of left total hip arthroplasty performed on 5/18/2022. She underwent revision from hemiarthroplasty to total hip arthroplasty via anterior approach. She is doing well postoperatively and walking with use of a cane. She is done with home physical therapy. She is taking oxycodone intermittently for pain. She rates pain as 3/10. She has had a rash on her left thigh that is being treated with a steroid cream and is improving. Patient Active Problem List   Diagnosis    Chronic back pain    Home accident    Depression    Esophageal reflux    Acute blood loss anemia    Hip joint replacement by other means    Hip pain    Strain of hip flexor    Closed nondisplaced fracture of left patella    Left knee pain    Closed displaced transverse fracture of patella with routine healing    Disorder of bone and cartilage    Carpal tunnel syndrome    Brachial neuritis    Atrophic vaginitis    Postmenopausal osteoporosis       ROS:  Constitutional: denies fever, chills, weight loss  MSK: denies pain in other joints, muscle aches  Neurological: denies numbness, tingling, weakness    Exam:  Appearance: sitting in exam room chair, appears to be in no acute distress, awake and alert  Resp: unlabored breathing on room air  Skin: warm, dry and intact with out erythema or significant increased temperature  Neuro: grossly intact both lower extremities. Intact sensation to light touch. Motor exam 4+ to 5/5 in all major motor groups. Left hip: Incision is healing as expected. She has a petechial rash over her left thigh. There is no evidence of infection. Examination demonstrates negative logroll and negative Stinchfield. There is brisk capillary refill. Strength is 5/5 in hamstrings, quads, hip flexors.     Imaging:  3 views of the left hip were performed and reviewed today. Significant for total hip arthroplasty prosthesis in place with no signs of osteolysis, loosening, fracture, or dislocation. Assessment:  S/p left total hip arthroplasty    Plan:  She is doing well postoperatively. She will begin a home exercise program.  Oxycodone was refilled today and she will wean off of this as tolerated. She will follow-up in 4 weeks for repeat assessment. This dictation was done with Dragon dictation and may contain mechanical errors related to translation.

## 2022-06-13 ENCOUNTER — PATIENT MESSAGE (OUTPATIENT)
Dept: ORTHOPEDIC SURGERY | Age: 62
End: 2022-06-13

## 2022-06-13 DIAGNOSIS — Z96.642 H/O TOTAL HIP ARTHROPLASTY, LEFT: ICD-10-CM

## 2022-06-13 RX ORDER — OXYCODONE HYDROCHLORIDE 5 MG/1
5 TABLET ORAL EVERY 6 HOURS PRN
Qty: 28 TABLET | Refills: 0 | Status: SHIPPED | OUTPATIENT
Start: 2022-06-13 | End: 2022-06-20

## 2022-06-13 NOTE — TELEPHONE ENCOUNTER
From: Donnalee Blizzard  To: Dr. Jeffrey Foley: 6/13/2022 9:37 AM EDT  Subject: oxycodone    Good morning, could you give me one more refill of the oxycodone? I believe I will not need any more after this.  Thank you, Jon Ramirez

## 2022-06-30 ENCOUNTER — OFFICE VISIT (OUTPATIENT)
Dept: ORTHOPEDIC SURGERY | Age: 62
End: 2022-06-30

## 2022-06-30 VITALS — BODY MASS INDEX: 25.16 KG/M2 | WEIGHT: 142 LBS | HEIGHT: 63 IN

## 2022-06-30 DIAGNOSIS — Z96.642 H/O TOTAL HIP ARTHROPLASTY, LEFT: Primary | ICD-10-CM

## 2022-06-30 PROCEDURE — 99024 POSTOP FOLLOW-UP VISIT: CPT | Performed by: ORTHOPAEDIC SURGERY

## 2022-07-25 RX ORDER — TRAZODONE HYDROCHLORIDE 50 MG/1
100 TABLET ORAL NIGHTLY
Qty: 60 TABLET | Refills: 2 | Status: SHIPPED | OUTPATIENT
Start: 2022-07-25 | End: 2022-10-05

## 2022-08-25 ENCOUNTER — TELEPHONE (OUTPATIENT)
Dept: ORTHOPEDIC SURGERY | Age: 62
End: 2022-08-25

## 2022-08-25 DIAGNOSIS — Z96.642 H/O TOTAL HIP ARTHROPLASTY, LEFT: Primary | ICD-10-CM

## 2022-09-01 ENCOUNTER — HOSPITAL ENCOUNTER (OUTPATIENT)
Dept: PHYSICAL THERAPY | Age: 62
Setting detail: THERAPIES SERIES
Discharge: HOME | End: 2022-09-01
Payer: MEDICARE

## 2022-09-01 DIAGNOSIS — R26.89 DECREASED FUNCTIONAL MOBILITY: Primary | ICD-10-CM

## 2022-09-01 PROCEDURE — 97110 THERAPEUTIC EXERCISES: CPT

## 2022-09-01 PROCEDURE — 97140 MANUAL THERAPY 1/> REGIONS: CPT

## 2022-09-01 PROCEDURE — 97161 PT EVAL LOW COMPLEX 20 MIN: CPT

## 2022-09-01 PROCEDURE — 97112 NEUROMUSCULAR REEDUCATION: CPT

## 2022-09-01 NOTE — PROGRESS NOTES
[facility], [facility] [address] [address]  Phone: [phone]   Fax:     [phone]                                                       Physical Therapy Certification    Dear Dr. Austen Lazcano,    We had the pleasure of evaluating the following patient for physical therapy services at [facility]. A summary of our findings can be found in the initial assessment below. This includes our plan of care. If you have any questions or concerns regarding these findings, please do not hesitate to contact me at the office phone number checked above. Thank you for the referral.       Physician Signature:_______________________________Date:__________________  By signing above (or electronic signature), therapists plan is approved by physician        Patient: Etienne Mcgovern   : 1960   MRN: [id number]  Referring Provider:  Dr. Austen Lazcano     Evaluation Date: 2022              Medical Diagnosis:  Presence of left artificial hip joint [Z96.642]    Treatment Diagnosis:    ICD-10-CM    1. Decreased functional mobility  R26.89                                    Insurance information: PT Insurance Information: [employer]       Precautions/ Contra-indications: brachial neuritis, PMH 2 GUILHERME L  Latex Allergy:  [x]NO      []YES  Preferred Language for Healthcare:   [x]English       []other:    C-SSRS Triggered by Intake questionnaire (Past 2 wk assessment ):   [x] No, Questionnaire did not trigger screening.   [] Yes, Patient intake triggered C-SSRS Screening      [] C-SSRS Screening completed  [] PCP notified via Epic     SUBJECTIVE: Patient stated she has had 2 GUILHERME for L LE, the most recent 2022. Pt stated she had an anterior hip procedure. Pt stated her doctor informed her that she has no restrictions. Pt had home PT thru .   Pt has been working with her HEP from home PT. Currently pt has pain after she sits and starts to stand and walk. Pt stated the first 4 steps are challenging \"and then it loosens up. \"  Pt does not have pain when she is sitting. Pt has not taken long walks because \"I don't trust it yet. \" Pt stated when she is cleaning and doing laundry, duties where she must kneel and then get back up to standing, that she has weakness and stiffness and has difficulty getting back up to a standing position. Pt stated her balance \"can be improved. \"      Relevant Medical History:see below  Functional Scale/Score: FOTO LE = 62    Pain Scale: 2/10 L anterior thigh      Type: []Constant   [x]Intermittent  []Radiating []Localized []other:     Numbness/Tingling: \"there's a numbness and tingling at the same time\" at the L anterolateral thigh as far distal as the L femur lateral epicondyle. Occupation/School: retired    Living Status/Prior Level of Function: Independent with ADLs and IADLs    OBJECTIVE:   Posture: WFL    SLS: L fair, R poor (less than 2 seconds)    Functional Mobility/Transfers:  I    Palpation: L rectus lateralis, L distal ITB, L TFL    Bandages/Dressings/Incisions: incisions closed and healed well following posterior hip approach 2013 and following anterior hip approach 5/2022    Gait: (include devices/WB status) decreased trunk rotation L, decreased L weight shift  Stair climbing: inconsistent- sometimes alternated, sometimes stepped up with just one leg, guarded    ROM LEFT RIGHT- WFL   HIP Flex [facility]    HIP Abd WFL, groin pain    HIP Ext     HIP IR     HIP ER     Knee ext [facility]    Knee Flex [facility]    Ankle PF WFL    Ankle DF WFL    Ankle In [facility]    Ankle Ev [facility]    Strength  LEFT RIGHT   HIP Flexors 4-/5 5/5   Hip Adductors     HIP Abductors 3-/5  10.0#  C/o groin pain 4+/5  21.8#   HIP Ext     Hip ER     Knee EXT (quad) 4/5, pain  29.5# 5/5  46.1#   Knee Flex (HS) 4+/5  30.4# 5/5  50.3#   Ankle DF 5/5 5/5   Ankle PF 5/5 5/5   Ankle Inv 5/5 5/5   Ankle EV 5/5 5/5   Toe flexors     Toe extensors     Tone  Quads  Glutes   Fair  Fair, delayed   Good  good                              [x] Patient history, allergies, meds reviewed. Medical chart reviewed. See intake form. Review Of Systems (ROS):  [x]Performed Review of systems (Integumentary, CardioPulmonary, Neurological) by intake and observation. Intake form has been scanned into medical record. Patient has been instructed to contact their primary care physician regarding ROS issues if not already being addressed at this time.       Co-morbidities/Complexities (which will affect course of rehabilitation):   []None           Arthritic conditions   []Rheumatoid arthritis (M05.9)  [x]Osteoarthritis (M19.91)   Cardiovascular conditions   []Hypertension (I10)  []Hyperlipidemia (E78.5)  []Angina pectoris (I20)  []Atherosclerosis (I70)  []CVA Musculoskeletal conditions   [x]Disc pathology- neck   []Congenital spine pathologies   []Prior surgical intervention  [x]Osteoporosis (M81.8)  []Osteopenia (M85.8)   Endocrine conditions   []Hypothyroid (E03.9)  []Hyperthyroid Gastrointestinal conditions   []Constipation (W78.57)   Metabolic conditions   []Morbid obesity (E66.01)  []Diabetes type 1(E10.65) or 2 (E11.65)   []Neuropathy (G60.9)     Pulmonary conditions   []Asthma (J45)  []Coughing   []COPD (J44.9)   Psychological Disorders  []Anxiety (F41.9)  []Depression (F32.9)   []Other:   [x]Other:     Brachial neuritis  Hip fx, L, 2013  GUILHERME, L, 2013 5/18/22 conversion of prior hip surgery to L GUILHERME         Barriers to/and or personal factors that will affect rehab potential:              []Age  []Sex    []Smoker              []Motivation/Lack of Motivation                        []Co-Morbidities              []Cognitive Function, education/learning barriers              []Environmental, home barriers              []profession/work barriers  []past PT/medical experience  []other:  Justification:     Falls Risk Assessment (30 days):   [x] Falls Risk assessed and no intervention required. [] Falls Risk assessed and Patient requires intervention due to being higher risk   TUG score (>12s at risk):     [] Falls education provided, including         ASSESSMENT:   Functional Impairments:     []Noted lumbar/proximal hip/LE hypomobility   [x]Decreased LE functional ROM   [x]Decreased core/proximal hip strength and neuromuscular control   [x]Decreased LE functional strength   [x]Reduced balance/proprioceptive control   []other:      Functional Activity Limitations (from functional questionnaire and intake)   [x]Reduced ability to tolerate prolonged functional positions   []Reduced ability or difficulty with changes of positions or transfers between positions   []Reduced ability to maintain good posture and demonstrate good body mechanics with sitting, bending, and lifting   []Reduced ability to sleep   [] Reduced ability or tolerance with driving and/or computer work   [x]Reduced ability to perform lifting, carrying tasks   [x]Reduced ability to squat   [x]Reduced ability to forward bend   [x]Reduced ability to ambulate prolonged functional periods/distances/surfaces   [x]Reduced ability to ascend/descend stairs   [x]Reduced ability to run, hop or jump   []other:     Participation Restrictions   []Reduced participation in self care activities   [x]Reduced participation in home management activities   []Reduced participation in work activities   [x]Reduced participation in social activities. [x]Reduced participation in sport/recreation activities. Classification :    [x]Signs/symptoms consistent with post-surgical status including decreased ROM, strength and function.    []Signs/symptoms consistent with joint sprain/strain   []Signs/symptoms consistent with patella-femoral syndrome   []Signs/symptoms consistent with knee OA/hip OA   []Signs/symptoms consistent with internal derangement of knee/Hip   []Signs/symptoms consistent with functional hip weakness/NMR control      []Signs/symptoms consistent with tendinitis/tendinosis    []signs/symptoms consistent with pathology which may benefit from Dry needling      []other:      Prognosis/Rehab Potential:      []Excellent   [x]Good    []Fair   []Poor    Tolerance of evaluation/treatment:    []Excellent   [x]Good    []Fair   []Poor    Physical Therapy Evaluation Complexity Justification  [x] A history of present problem with:  [] no personal factors and/or comorbidities that impact the plan of care;  [x]1-2 personal factors and/or comorbidities that impact the plan of care  []3 personal factors and/or comorbidities that impact the plan of care  [x] An examination of body systems using standardized tests and measures addressing any of the following: body structures and functions (impairments), activity limitations, and/or participation restrictions;:  [] a total of 1-2 or more elements   [x] a total of 3 or more elements   [] a total of 4 or more elements   [x] A clinical presentation with:  [x] stable and/or uncomplicated characteristics   [] evolving clinical presentation with changing characteristics  [] unstable and unpredictable characteristics;   [x] Clinical decision making of [] low, [] moderate, [] high complexity using standardized patient assessment instrument and/or measurable assessment of functional outcome. [x] EVAL (LOW) 36325 (typically 20 minutes face-to-face)  [] EVAL (MOD) 32881 (typically 30 minutes face-to-face)  [] EVAL (HIGH) 47520 (typically 45 minutes face-to-face)  [] RE-EVAL     PLAN:  Frequency/Duration:  1-2 days per week for 6 Weeks:  Interventions:  [x]  Therapeutic exercise including: strength training, ROM, for Lower extremity and core   [x]  NMR activation and proprioception for LE, Glutes and Core   [x]  Manual therapy as indicated for LE, Hip and spine to include: Dry Needling/IASTM, STM, PROM, Gr I-IV mobilizations, manipulation.    [x] Modalities as needed that may include: thermal agents, E-stim, Biofeedback, US, iontophoresis as indicated  [x] Patient education on joint protection, postural re-education, activity modification, progression of HEP. HEP instruction:   Reviewed HEP currently- heel raises, standing abduction. Access Code: [email or web address]  URL: [email or web address]  Date: 09/01/2022  Prepared by: Nina Taveras    Exercises  Supine Hip Adduction Isometric with Jodionte Finch - 2 x daily - 7 x weekly - 10 reps - 10 hold  Supine Quadricep Sets - 2 x daily - 7 x weekly - 10 reps - 10 hold  Supine Hip Abduction - 2 x daily - 7 x weekly - 2 sets - 10 reps  Seated Long Arc Quad - 2 x daily - 7 x weekly - 10 reps - 10 hold  Supine Gluteal Sets - 2 x daily - 7 x weekly - 10 reps - 10 hold  Hooklying Single Leg March - 2 x daily - 7 x weekly - 2 sets - 10 reps      GOALS:  Patient stated goal: \"exercise and stretching\"  [] Progressing: [] Met: [] Not Met: [] Adjusted    Therapist goals for Patient:   Short Term Goals: To be achieved in: 2 weeks  1. Independent in HEP and progression per patient tolerance, in order to prevent re-injury. [] Progressing: [] Met: [] Not Met: [] Adjusted  2. Patient will have a decrease in pain to facilitate improvement in movement, function, and ADLs as indicated by Functional Deficits. [] Progressing: [] Met: [] Not Met: [] Adjusted    Long Term Goals: To be achieved in: 6 weeks  1. FOTO LE will score 70 to assist with reaching prior level of function. [] Progressing: [] Met: [] Not Met: [] Adjusted  2. Patient will demonstrate increased AROM to full and painless to allow for proper joint functioning as indicated by patients Functional Deficits. [] Progressing: [] Met: [] Not Met: [] Adjusted  3. Patient will demonstrate an increase in Strength L quads to good proximal hip strength and control, within 5lb HHD in LE to allow for proper functional mobility as indicated by patients Functional Deficits.    [] Progressing: [] Met: [] Not Met: [] Adjusted  4. Patient will return to taking 1 mile walks for fitness without increased symptoms or restriction. [] Progressing: [] Met: [] Not Met: [] Adjusted  5. Patient will be able to get out of a chair without difficulty and without L hip and thigh pain. [] Progressing: [] Met: [] Not Met: [] Adjusted     Electronically signed by:  Anna Doty PT , PAUL-C, [id number]        Note: If patient does not return for scheduled/recommended follow up visits, this note will serve as a discharge from care along with the most recent update on progress.

## 2022-09-07 ENCOUNTER — HOSPITAL ENCOUNTER (OUTPATIENT)
Dept: PHYSICAL THERAPY | Age: 62
Setting detail: THERAPIES SERIES
Discharge: HOME | End: 2022-09-07
Payer: MEDICARE

## 2022-09-07 PROCEDURE — 97112 NEUROMUSCULAR REEDUCATION: CPT

## 2022-09-07 PROCEDURE — 97110 THERAPEUTIC EXERCISES: CPT

## 2022-09-07 PROCEDURE — 97140 MANUAL THERAPY 1/> REGIONS: CPT

## 2022-09-07 NOTE — FLOWSHEET NOTE
Program: Patient was instructed in the following for HEP:       Reviewed HEP currently- heel raises, standing abduction. Access Code: [email or web address]  URL: [email or web address]  Date: 09/01/2022  Prepared by: Poncho Bowman     Exercises  Supine Hip Adduction Isometric with Prashant Bebe - 2 x daily - 7 x weekly - 10 reps - 10 hold  Supine Quadricep Sets - 2 x daily - 7 x weekly - 10 reps - 10 hold  Supine Hip Abduction - 2 x daily - 7 x weekly - 2 sets - 10 reps  Seated Long Arc Quad - 2 x daily - 7 x weekly - 10 reps - 10 hold  Supine Gluteal Sets - 2 x daily - 7 x weekly - 10 reps - 10 hold  Hooklying Single Leg March - 2 x daily - 7 x weekly - 2 sets - 10 reps      Therapeutic Exercise and NMR EXR  [] (64779) Provided verbal/tactile cueing for activities related to strengthening, flexibility, endurance, ROM for improvements in LE, proximal hip, and core control with self care, mobility, lifting, ambulation.  [] (49693) Provided verbal/tactile cueing for activities related to improving balance, coordination, kinesthetic sense, posture, motor skill, proprioception  to assist with LE, proximal hip, and core control in self care, mobility, lifting, ambulation and eccentric single leg control.      NMR and Therapeutic Activities:    [] (12900 or 29622) Provided verbal/tactile cueing for activities related to improving balance, coordination, kinesthetic sense, posture, motor skill, proprioception and motor activation to allow for proper function of core, proximal hip and LE with self care and ADLs and functional mobility.   [] (44891) Gait Re-education- Provided training and instruction to the patient for proper LE, core and proximal hip recruitment and positioning and eccentric body weight control with ambulation re-education including up and down stairs     Home Exercise Program:    [x] (61978) Reviewed/Progressed HEP activities related to strengthening, flexibility, endurance, ROM of core, proximal hip and LE for functional self-care, mobility, lifting and ambulation/stair navigation   [] (76154)Reviewed/Progressed HEP activities related to improving balance, coordination, kinesthetic sense, posture, motor skill, proprioception of core, proximal hip and LE for self care, mobility, lifting, and ambulation/stair navigation      Manual Treatments:  PROM / STM / Oscillations-Mobs:  G-I, II, III, IV (PA's, Inf., Post.)  [] (04688) Provided manual therapy to mobilize LE, proximal hip and/or LS spine soft tissue/joints for the purpose of modulating pain, promoting relaxation,  increasing ROM, reducing/eliminating soft tissue swelling/inflammation/restriction, improving soft tissue extensibility and allowing for proper ROM for normal function with self care, mobility, lifting and ambulation. Approval Dates:  CPT Code Units Approved Units Used  Date Updated:                     Charges:  Timed Code Treatment Minutes: 45   Total Treatment Minutes: 45      [] EVAL (LOW) 05635 (typically 20 minutes face-to-face)  [] EVAL (MOD) 94948 (typically 30 minutes face-to-face)  [] EVAL (HIGH) 29045 (typically 45 minutes face-to-face)  [] RE-EVAL     [x] RS(10740) x     [] Dry needle 1 or 2 Muscles (93925)  [x] NMR (29024) x     [] Dry needle 3+ Muscles (76429)  [x] Manual (20856) x     [] Ultrasound (80158) x  [] TA (13267) x     [] Mech Traction (08528)  [] ES(attended) (09504)     [] ES (un) (26399  [facility]):   [] Vasopump (22705) [] Ionto (16193)   [] Other:    GOALS:  Patient stated goal: \"exercise and stretching\"  [] Progressing: [] Met: [] Not Met: [] Adjusted     Therapist goals for Patient:   Short Term Goals: To be achieved in: 2 weeks  1. Independent in HEP and progression per patient tolerance, in order to prevent re-injury. [] Progressing: [] Met: [] Not Met: [] Adjusted  2. Patient will have a decrease in pain to facilitate improvement in movement, function, and ADLs as indicated by Functional Deficits.   [] Progressing: [] Met: [] Not Met: [] Adjusted     Long Term Goals: To be achieved in: 6 weeks  1. FOTO LE will score 70 to assist with reaching prior level of function. [] Progressing: [] Met: [] Not Met: [] Adjusted  2. Patient will demonstrate increased AROM to full and painless to allow for proper joint functioning as indicated by patients Functional Deficits. [] Progressing: [] Met: [] Not Met: [] Adjusted  3. Patient will demonstrate an increase in Strength L quads to good proximal hip strength and control, within 5lb HHD in LE to allow for proper functional mobility as indicated by patients Functional Deficits. [] Progressing: [] Met: [] Not Met: [] Adjusted  4. Patient will return to taking 1 mile walks for fitness without increased symptoms or restriction. [] Progressing: [] Met: [] Not Met: [] Adjusted  5. Patient will be able to get out of a chair without difficulty and without L hip and thigh pain. [] Progressing: [] Met: [] Not Met: [] Adjusted         ASSESSMENT:  9/7: Patient hypersensitive over several spots of her L thigh/hip. Light touch elicits a jumping response at these spots. Patient reports an \"electrifying\" sensation when touched    Treatment/Activity Tolerance:  [x] Patient tolerated treatment well [] Patient limited by fatique  [] Patient limited by pain  [] Patient limited by other medical complications  [] Other:     Overall Progression Towards Functional goals/ Treatment Progress Update:  [] Patient is progressing as expected towards functional goals listed. [] Progression is slowed due to complexities/Impairments listed. [] Progression has been slowed due to co-morbidities.   [x] Plan just implemented, too soon to assess goals progression <30days   [] Goals require adjustment due to lack of progress  [] Patient is not progressing as expected and requires additional follow up with physician  [] Other    Prognosis for POC: [x] Good [] Fair  [] Poor    Patient requires continued skilled intervention: [x] Yes  [] No        PLAN:   [] Continue per plan of care [] Alter current plan (see comments)  [x] Plan of care initiated [] Hold pending MD visit [] Discharge    Electronically signed by: Prakash Yancey, PTA [id number]      Note: If patient does not return for scheduled/recommended follow up visits, this note will serve as a discharge from care along with the most recent update on progress.

## 2022-09-09 ENCOUNTER — APPOINTMENT (OUTPATIENT)
Dept: PHYSICAL THERAPY | Age: 62
End: 2022-09-09
Payer: MEDICARE

## 2022-09-14 ENCOUNTER — HOSPITAL ENCOUNTER (OUTPATIENT)
Dept: PHYSICAL THERAPY | Age: 62
Setting detail: THERAPIES SERIES
Discharge: HOME | End: 2022-09-14
Payer: MEDICARE

## 2022-09-14 PROCEDURE — 97140 MANUAL THERAPY 1/> REGIONS: CPT

## 2022-09-14 PROCEDURE — 97110 THERAPEUTIC EXERCISES: CPT

## 2022-09-14 PROCEDURE — 97112 NEUROMUSCULAR REEDUCATION: CPT

## 2022-09-14 NOTE — FLOWSHEET NOTE
[facility] - Outpatient Rehabilitation and Therapy, [facility] [address] [place] [address]  Phone: [phone]   Fax:     [phone]      Physical Therapy Treatment Note/ Progress Report:     Date:  2022    Patient Name:  Lenore Arrington    :  1960  MRN: [id number]    Pertinent Medical History:     Evaluation Date: 2022                                                   Medical Diagnosis:  Presence of left artificial hip joint [Z96.642]     Treatment Diagnosis:      ICD-10-CM     1. Decreased functional mobility  R26.89                                      Insurance information: PT Insurance Information: [employer]; 16 visits thru 22 approved via [employer]    Physician Information:  [address] of care signed (Y/N): Yes    Date of Patient follow up with Physician:      Progress Report: []  Yes  [x]  No     Date Range for reporting period:  Beginnin22  Ending:      Progress report due (10 Rx/or 30 days whichever is less):      Recertification due (POC duration/ or 90 days whichever is less):      Visit # POC/Insurance Allowable Auth Needed   3  [x]Yes   []No     Latex Allergy:  [x]NO      []YES  Preferred Language for Healthcare:   [x]English       []other:  Functional Scale: FOTO HIP = 2262    Pain level:  2/10 L anterolateral thigh     History of Injury: Patient stated she has had 2 GUILHERME for L LE, the most recent 2022. Pt stated she had an anterior hip procedure. Pt stated her doctor informed her that she has no restrictions. Pt had home PT thru . Pt has been working with her HEP from home PT. Currently pt has pain after she sits and starts to stand and walk. Pt stated the first 4 steps are challenging \"and then it loosens up. \"  Pt does not have pain when she is sitting. Pt has not taken long walks because \"I don't trust it yet. \" Pt stated when she is cleaning and doing laundry, duties where she must kneel and then get back up to standing, that she has weakness and stiffness and has difficulty getting back up to a standing position. Pt stated her balance \"can be improved. \"      SUBJECTIVE:  See eval  9/7: Doing good. Lateral hip still sore  9/14/22:  Patient reports hip is still sore, with the entire length of the IT band sore.     OBJECTIVE:  Observation:   SLS: L fair, R poor (less than 2 seconds)  Palpation: L rectus lateralis, L distal ITB, L TFL  Gait: (include devices/WB status) decreased trunk rotation L, decreased L weight shift          9/14/22:  Patient demonstrates increased circumduction L LE during swing, Trendelenberg gait  Stair climbing: inconsistent- sometimes alternated, sometimes stepped up with just one leg, guarded    Test measurements:    ROM LEFT   HIP Flex WFL   HIP Abd WFL, groin pain   HIP Ext     HIP IR     HIP ER     Strength  LEFT   HIP Flexors 4-/5   Hip Adductors NT   HIP Abductors 3-/5  10.0#  C/o groin pain   HIP Ext NT    Hip ER NT    Knee EXT (quad) 4/5, pain  29.5#   Knee Flex (HS) 4+/5  30.4#   Tone  Quads  Glutes    Fair  Fair, delayed       RESTRICTIONS/PRECAUTIONS: PMH 2 GUILHERME, most recent 5/22    Exercises/Interventions:     Therapeutic Ex (22250)   Min: 10 Reps/Resistance Notes/CUES   Nu Step Level 1 x 5' Seat, UEs #6   HEP See below                             Manual Intervention (21938)  Min:20     STM L TFL, L distal ITB Entire length, mod aggressive R side lying                            NMR re-education (65489)  Min:15  CUES NEEDED   JEANIE  Knee flexion  Knee extension Settings 8 and 9, 10x each    Standing R SLS  L step to (focus R stance) 10x, 5\" hold R/L  10x R/L Held 2 rails, focus R glut set   Step up R LE 2\" X10 R/L         Therapeutic Activity (15479)  Min:               Modalities  Min:     IFC with      CP after exercises     MH after exercises            Other Therapeutic Activities: Pt was educated on PT POC, Diagnosis, Prognosis, pathomechanics as well as frequency and duration of scheduling future physical therapy appointments. Time was also taken on this day to answer all patient questions and participation in PT. Reviewed appointment policy in detail with patient and patient verbalized understanding. Home Exercise Program: Patient was instructed in the following for HEP:       Reviewed HEP currently- heel raises, standing abduction. Access Code: [email or web address]  URL: [email or web address]  Date: 09/01/2022  Prepared by: Sheila Epperson     Exercises  Supine Hip Adduction Isometric with Yuri Meigs - 2 x daily - 7 x weekly - 10 reps - 10 hold  Supine Quadricep Sets - 2 x daily - 7 x weekly - 10 reps - 10 hold  Supine Hip Abduction - 2 x daily - 7 x weekly - 2 sets - 10 reps  Seated Long Arc Quad - 2 x daily - 7 x weekly - 10 reps - 10 hold  Supine Gluteal Sets - 2 x daily - 7 x weekly - 10 reps - 10 hold  Hooklying Single Leg March - 2 x daily - 7 x weekly - 2 sets - 10 reps    9/14/22:   Patient instructed in step thru with focus on recruiting glut, stopping Trendelenberg; written instructions with pictures issued, patient able to demonstrate exercises. Therapeutic Exercise and NMR EXR  [] (41536) Provided verbal/tactile cueing for activities related to strengthening, flexibility, endurance, ROM for improvements in LE, proximal hip, and core control with self care, mobility, lifting, ambulation.  [] (59736) Provided verbal/tactile cueing for activities related to improving balance, coordination, kinesthetic sense, posture, motor skill, proprioception  to assist with LE, proximal hip, and core control in self care, mobility, lifting, ambulation and eccentric single leg control.      NMR and Therapeutic Activities:    [] (36985 or 54975) Provided verbal/tactile cueing for activities related to improving balance, coordination, kinesthetic sense, posture, motor skill, proprioception and motor activation to allow for proper function of core, proximal hip and LE with self care and ADLs and functional mobility.   [] (70689) Gait Re-education- Provided training and instruction to the patient for proper LE, core and proximal hip recruitment and positioning and eccentric body weight control with ambulation re-education including up and down stairs     Home Exercise Program:    [x] (83470) Reviewed/Progressed HEP activities related to strengthening, flexibility, endurance, ROM of core, proximal hip and LE for functional self-care, mobility, lifting and ambulation/stair navigation   [] (42200)Reviewed/Progressed HEP activities related to improving balance, coordination, kinesthetic sense, posture, motor skill, proprioception of core, proximal hip and LE for self care, mobility, lifting, and ambulation/stair navigation      Manual Treatments:  PROM / STM / Oscillations-Mobs:  G-I, II, III, IV (PA's, Inf., Post.)  [] (56864) Provided manual therapy to mobilize LE, proximal hip and/or LS spine soft tissue/joints for the purpose of modulating pain, promoting relaxation,  increasing ROM, reducing/eliminating soft tissue swelling/inflammation/restriction, improving soft tissue extensibility and allowing for proper ROM for normal function with self care, mobility, lifting and ambulation.      Charges:  Timed Code Treatment Minutes: 45   Total Treatment Minutes: 45      [] EVAL (LOW) 03417 (typically 20 minutes face-to-face)  [] EVAL (MOD) 63484 (typically 30 minutes face-to-face)  [] EVAL (HIGH) 69232 (typically 45 minutes face-to-face)  [] RE-EVAL     [x] KM(04239) x     [] Dry needle 1 or 2 Muscles (11663)  [x] NMR (16186) x     [] Dry needle 3+ Muscles (19201)  [x] Manual (67648) x     [] Ultrasound (70149) x  [] TA (61559) x     [] Mech Traction (83103)  [] ES(attended) (00815)     [] ES (un) (72015):   [] Vasopump (77685) [] Ionto (48532)   [] Other:    GOALS:  Patient stated goal: \"exercise and stretching\"  [] Progressing: [] Met: [] Not Met: [] Adjusted Therapist goals for Patient:   Short Term Goals: To be achieved in: 2 weeks  1. Independent in HEP and progression per patient tolerance, in order to prevent re-injury. [] Progressing: [] Met: [] Not Met: [] Adjusted  2. Patient will have a decrease in pain to facilitate improvement in movement, function, and ADLs as indicated by Functional Deficits. [] Progressing: [] Met: [] Not Met: [] Adjusted     Long Term Goals: To be achieved in: 6 weeks  1. FOTO LE will score 70 to assist with reaching prior level of function. [] Progressing: [] Met: [] Not Met: [] Adjusted  2. Patient will demonstrate increased AROM to full and painless to allow for proper joint functioning as indicated by patients Functional Deficits. [] Progressing: [] Met: [] Not Met: [] Adjusted  3. Patient will demonstrate an increase in Strength L quads to good proximal hip strength and control, within 5lb HHD in LE to allow for proper functional mobility as indicated by patients Functional Deficits. [] Progressing: [] Met: [] Not Met: [] Adjusted  4. Patient will return to taking 1 mile walks for fitness without increased symptoms or restriction. [] Progressing: [] Met: [] Not Met: [] Adjusted  5. Patient will be able to get out of a chair without difficulty and without L hip and thigh pain. [] Progressing: [] Met: [] Not Met: [] Adjusted         ASSESSMENT:  9/7: Patient hypersensitive over several spots of her L thigh/hip. Light touch elicits a jumping response at these spots. Patient reports an \"electrifying\" sensation when touched    Treatment/Activity Tolerance:  [x] Patient tolerated treatment well [] Patient limited by fatique  [] Patient limited by pain  [] Patient limited by other medical complications  [] Other:     Overall Progression Towards Functional goals/ Treatment Progress Update:  [] Patient is progressing as expected towards functional goals listed.     [] Progression is slowed due to complexities/Impairments listed. [] Progression has been slowed due to co-morbidities. [x] Plan just implemented, too soon to assess goals progression <30days   [] Goals require adjustment due to lack of progress  [] Patient is not progressing as expected and requires additional follow up with physician  [] Other    Prognosis for POC: [x] Good [] Fair  [] Poor    Patient requires continued skilled intervention: [x] Yes  [] No        PLAN:   [] Continue per plan of care [] Alter current plan (see comments)  [x] Plan of care initiated [] Hold pending MD visit [] Discharge    Electronically signed by: Kalia Arroyo, PT [id number]      Note: If patient does not return for scheduled/recommended follow up visits, this note will serve as a discharge from care along with the most recent update on progress.

## 2022-09-16 ENCOUNTER — PATIENT MESSAGE (OUTPATIENT)
Dept: FAMILY MEDICINE CLINIC | Age: 62
End: 2022-09-16

## 2022-09-16 RX ORDER — OXYBUTYNIN CHLORIDE 5 MG/1
5 TABLET ORAL DAILY
Qty: 90 TABLET | Refills: 1 | Status: SHIPPED | OUTPATIENT
Start: 2022-09-16

## 2022-09-16 NOTE — TELEPHONE ENCOUNTER
From: Renae Gracia  To: Froy Yoo  Sent: 9/16/2022 10:44 AM EDT  Subject: Oxybutynin    Hello, I am needed a refill for my oxybutynin med. Not sure why [facility] just didn't contact you instead. If you could send that over there I would appreciate it.  Thanks, Ashley Nguyễn

## 2022-09-19 RX ORDER — OMEPRAZOLE 20 MG/1
CAPSULE, DELAYED RELEASE ORAL
Qty: 90 CAPSULE | Refills: 1 | Status: SHIPPED | OUTPATIENT
Start: 2022-09-19

## 2022-09-20 ENCOUNTER — HOSPITAL ENCOUNTER (OUTPATIENT)
Dept: PHYSICAL THERAPY | Age: 62
Setting detail: THERAPIES SERIES
Discharge: HOME | End: 2022-09-20
Payer: MEDICARE

## 2022-09-20 PROCEDURE — 97140 MANUAL THERAPY 1/> REGIONS: CPT

## 2022-09-20 PROCEDURE — 97112 NEUROMUSCULAR REEDUCATION: CPT

## 2022-09-20 PROCEDURE — 97110 THERAPEUTIC EXERCISES: CPT

## 2022-09-20 NOTE — FLOWSHEET NOTE
[facility] - Outpatient Rehabilitation and Therapy, [address] [place] [address]  Phone: [phone]   Fax:     [phone]      Physical Therapy Treatment Note/ Progress Report:     Date:  2022    Patient Name:  Lam Hand    :  1960  MRN: [id number]    Pertinent Medical History:     Evaluation Date: 2022                                                   Medical Diagnosis:  Presence of left artificial hip joint [Z96.642]     Treatment Diagnosis:      ICD-10-CM     1. Decreased functional mobility  R26.89                                      Insurance information: PT Insurance Information: [employer]; 16 visits thru 22 approved via [employer]    Physician Information:  [address] of care signed (Y/N): Yes    Date of Patient follow up with Physician:      Progress Report: []  Yes  [x]  No     Date Range for reporting period:  Beginnin22  Ending:      Progress report due (10 Rx/or 30 days whichever is less):      Recertification due (POC duration/ or 90 days whichever is less):      Visit # POC/Insurance Allowable Auth Needed   4  [x]Yes   []No     Latex Allergy:  [x]NO      []YES  Preferred Language for Healthcare:   [x]English       []other:  Functional Scale: FOTO HIP = 2262    Pain level:  2/10 L anterolateral thigh     History of Injury: Patient stated she has had 2 GUILHERME for L LE, the most recent 2022. Pt stated she had an anterior hip procedure. Pt stated her doctor informed her that she has no restrictions. Pt had home PT thru . Pt has been working with her HEP from home PT. Currently pt has pain after she sits and starts to stand and walk. Pt stated the first 4 steps are challenging \"and then it loosens up. \"  Pt does not have pain when she is sitting. Pt has not taken long walks because \"I don't trust it yet. \" Pt stated when she is cleaning and doing laundry, duties where she must kneel and then get back up to standing, that she has weakness and stiffness and has difficulty getting back up to a standing position. Pt stated her balance \"can be improved. \"      SUBJECTIVE:  See eval  9/7: Doing good. Lateral hip still sore  9/14/22:  Patient reports hip is still sore, with the entire length of the IT band sore. 9/20/22 pt stated \"the leg is slowly getting strengthened up. \"    OBJECTIVE:  Observation:   SLS: L fair, R poor (less than 2 seconds)  Palpation: L rectus lateralis, L distal ITB, L TFL  Gait: (include devices/WB status) decreased trunk rotation L, decreased L weight shift          9/14/22:  Patient demonstrates increased circumduction L LE during swing, Trendelenberg gait  Stair climbing: inconsistent- sometimes alternated, sometimes stepped up with just one leg, guarded    Test measurements:    ROM LEFT  9/1/22   HIP Flex WFL   HIP Abd WFL, groin pain   HIP Ext     HIP IR     HIP ER     Strength  LEFT   HIP Flexors 4-/5   Hip Adductors NT   HIP Abductors 3-/5  10.0#  C/o groin pain   HIP Ext NT    Hip ER NT    Knee EXT (quad) 4/5, pain  29.5#   Knee Flex (HS) 4+/5  30.4#   Tone  Quads  Glutes    Fair  Fair, delayed       RESTRICTIONS/PRECAUTIONS: PMH 2 GUILHERME, most recent 5/22    Exercises/Interventions:     Therapeutic Ex (52665)   Min: 15 Reps/Resistance Notes/CUES   Nu Step Level 1 x 5' Seat, UEs #6        Standing hip  ABD L/R  Ext L/R   2x10  2x10    Heel raises  Minisquats 2x10  2x10    Supine ABD  Fall outs, small range 1# 10x, 2# 10x  10x On slide board   Bridge 10x, 3\" hold    SLABD 2x10, min A for guidance    Manual Intervention ([phone])  Min:15     STM L TFL, L distal ITB Entire length, mod aggressive R side lying                            NMR re-education (59420)  Min:15  CUES NEEDED   JEANIE  Knee flexion  Knee extension Settings 8 and 9, 10x each    Standing R SLS  L step to (focus R stance) 10x, 5\" hold R/L  10x R/L Held 2 rails, focus R glut set   Step up R LE 2\" X10 L  4\" 10x L         Therapeutic Activity (42337)  Min:               Modalities  Min:     IFC with      CP after exercises     MH after exercises            Other Therapeutic Activities: Pt was educated on PT POC, Diagnosis, Prognosis, pathomechanics as well as frequency and duration of scheduling future physical therapy appointments. Time was also taken on this day to answer all patient questions and participation in PT. Reviewed appointment policy in detail with patient and patient verbalized understanding. Home Exercise Program: Patient was instructed in the following for HEP:       Reviewed HEP currently- heel raises, standing abduction. Access Code: [email or web address]  URL: [email or web address]  Date: 09/01/2022  Prepared by: Chicho Sepulveda     Exercises  Supine Hip Adduction Isometric with Mario Alosa - 2 x daily - 7 x weekly - 10 reps - 10 hold  Supine Quadricep Sets - 2 x daily - 7 x weekly - 10 reps - 10 hold  Supine Hip Abduction - 2 x daily - 7 x weekly - 2 sets - 10 reps  Seated Long Arc Quad - 2 x daily - 7 x weekly - 10 reps - 10 hold  Supine Gluteal Sets - 2 x daily - 7 x weekly - 10 reps - 10 hold  Hooklying Single Leg March - 2 x daily - 7 x weekly - 2 sets - 10 reps    9/14/22:   Patient instructed in step thru with focus on recruiting glut, stopping Trendelenberg; written instructions with pictures issued, patient able to demonstrate exercises. Therapeutic Exercise and NMR EXR  [] (78174) Provided verbal/tactile cueing for activities related to strengthening, flexibility, endurance, ROM for improvements in LE, proximal hip, and core control with self care, mobility, lifting, ambulation.  [] (94199) Provided verbal/tactile cueing for activities related to improving balance, coordination, kinesthetic sense, posture, motor skill, proprioception  to assist with LE, proximal hip, and core control in self care, mobility, lifting, ambulation and eccentric single leg control.      NMR and Therapeutic Activities:    [] (81863 or 94125) Provided verbal/tactile cueing for activities related to improving balance, coordination, kinesthetic sense, posture, motor skill, proprioception and motor activation to allow for proper function of core, proximal hip and LE with self care and ADLs and functional mobility.   [] (29129) Gait Re-education- Provided training and instruction to the patient for proper LE, core and proximal hip recruitment and positioning and eccentric body weight control with ambulation re-education including up and down stairs     Home Exercise Program:    [x] (00778) Reviewed/Progressed HEP activities related to strengthening, flexibility, endurance, ROM of core, proximal hip and LE for functional self-care, mobility, lifting and ambulation/stair navigation   [] (55363)Reviewed/Progressed HEP activities related to improving balance, coordination, kinesthetic sense, posture, motor skill, proprioception of core, proximal hip and LE for self care, mobility, lifting, and ambulation/stair navigation      Manual Treatments:  PROM / STM / Oscillations-Mobs:  G-I, II, III, IV (PA's, Inf., Post.)  [] (21534) Provided manual therapy to mobilize LE, proximal hip and/or LS spine soft tissue/joints for the purpose of modulating pain, promoting relaxation,  increasing ROM, reducing/eliminating soft tissue swelling/inflammation/restriction, improving soft tissue extensibility and allowing for proper ROM for normal function with self care, mobility, lifting and ambulation.      Charges:  Timed Code Treatment Minutes: 45   Total Treatment Minutes: 45      [] EVAL (LOW) 98883 (typically 20 minutes face-to-face)  [] EVAL (MOD) 31617 (typically 30 minutes face-to-face)  [] EVAL (HIGH) 42882 (typically 45 minutes face-to-face)  [] RE-EVAL     [x] OL(66144) x     [] Dry needle 1 or 2 Muscles (79392)  [x] NMR (57437) x     [] Dry needle 3+ Muscles (00156)  [x] Manual (99504) x     [] Ultrasound (25864) x  [] TA () x     [] [facility] Traction (90544)  [] ES(attended) (74468)     [] ES (un) (98020):   [] Vasopump (67785) [] Ionto (82697)   [] Other:    GOALS:  Patient stated goal: \"exercise and stretching\"  [] Progressing: [] Met: [] Not Met: [] Adjusted     Therapist goals for Patient:   Short Term Goals: To be achieved in: 2 weeks  1. Independent in HEP and progression per patient tolerance, in order to prevent re-injury. [] Progressing: [] Met: [] Not Met: [] Adjusted  2. Patient will have a decrease in pain to facilitate improvement in movement, function, and ADLs as indicated by Functional Deficits. [] Progressing: [] Met: [] Not Met: [] Adjusted     Long Term Goals: To be achieved in: 6 weeks  1. FOTO LE will score 70 to assist with reaching prior level of function. [] Progressing: [] Met: [] Not Met: [] Adjusted  2. Patient will demonstrate increased AROM to full and painless to allow for proper joint functioning as indicated by patients Functional Deficits. [] Progressing: [] Met: [] Not Met: [] Adjusted  3. Patient will demonstrate an increase in Strength L quads to good proximal hip strength and control, within 5lb HHD in LE to allow for proper functional mobility as indicated by patients Functional Deficits. [] Progressing: [] Met: [] Not Met: [] Adjusted  4. Patient will return to taking 1 mile walks for fitness without increased symptoms or restriction. [] Progressing: [] Met: [] Not Met: [] Adjusted  5. Patient will be able to get out of a chair without difficulty and without L hip and thigh pain. [] Progressing: [] Met: [] Not Met: [] Adjusted         ASSESSMENT:  9/7: Patient hypersensitive over several spots of her L thigh/hip. Light touch elicits a jumping response at these spots.  Patient reports an \"electrifying\" sensation when touched    Treatment/Activity Tolerance:  [x] Patient tolerated treatment well [] Patient limited by fatique  [] Patient limited by pain  [] Patient limited by other medical complications  [] Other:     Overall Progression Towards Functional goals/ Treatment Progress Update:  [] Patient is progressing as expected towards functional goals listed. [] Progression is slowed due to complexities/Impairments listed. [] Progression has been slowed due to co-morbidities. [x] Plan just implemented, too soon to assess goals progression <30days   [] Goals require adjustment due to lack of progress  [] Patient is not progressing as expected and requires additional follow up with physician  [] Other    Prognosis for POC: [x] Good [] Fair  [] Poor    Patient requires continued skilled intervention: [x] Yes  [] No        PLAN:   [] Continue per plan of care [] Alter current plan (see comments)  [x] Plan of care initiated [] Hold pending MD visit [] Discharge    Electronically signed by: Silverio Bowman PT       Note: If patient does not return for scheduled/recommended follow up visits, this note will serve as a discharge from care along with the most recent update on progress.

## 2022-09-22 ENCOUNTER — HOSPITAL ENCOUNTER (OUTPATIENT)
Dept: PHYSICAL THERAPY | Age: 62
Setting detail: THERAPIES SERIES
Discharge: HOME | End: 2022-09-22
Payer: MEDICARE

## 2022-09-22 PROCEDURE — 97112 NEUROMUSCULAR REEDUCATION: CPT

## 2022-09-22 PROCEDURE — 97110 THERAPEUTIC EXERCISES: CPT

## 2022-09-22 PROCEDURE — 97140 MANUAL THERAPY 1/> REGIONS: CPT

## 2022-09-22 NOTE — FLOWSHEET NOTE
[facility] - Outpatient Rehabilitation and Therapy, [address] [place] [address]  Phone: [phone]   Fax:     [phone]      Physical Therapy Treatment Note/ Progress Report:     Date:  2022    Patient Name:  Stefano Sorenson    :  1960  MRN: [id number]    Pertinent Medical History:     Evaluation Date: 2022                                                   Medical Diagnosis:  Presence of left artificial hip joint [Z96.642]     Treatment Diagnosis:      ICD-10-CM     1. Decreased functional mobility  R26.89                                      Insurance information: PT Insurance Information: [employer]; 16 visits thru 22 approved via [employer]    Physician Information:  [address] of care signed (Y/N): Yes    Date of Patient follow up with Physician:      Progress Report: []  Yes  [x]  No     Date Range for reporting period:  Beginnin22  Ending:      Progress report due (10 Rx/or 30 days whichever is less):      Recertification due (POC duration/ or 90 days whichever is less):      Visit # POC/Insurance Allowable Auth Needed   5  [x]Yes   []No     Latex Allergy:  [x]NO      []YES  Preferred Language for Healthcare:   [x]English       []other:  Functional Scale: FOTO HIP = 2262    Pain level:  2/10 L anterolateral thigh     History of Injury: Patient stated she has had 2 GUILHERME for L LE, the most recent 2022. Pt stated she had an anterior hip procedure. Pt stated her doctor informed her that she has no restrictions. Pt had home PT thru . Pt has been working with her HEP from home PT. Currently pt has pain after she sits and starts to stand and walk. Pt stated the first 4 steps are challenging \"and then it loosens up. \"  Pt does not have pain when she is sitting. Pt has not taken long walks because \"I don't trust it yet. \" Pt stated when she is cleaning and doing laundry, duties where she must kneel and then get back up to standing, that she has weakness and stiffness and has difficulty getting back up to a standing position. Pt stated her balance \"can be improved. \"      SUBJECTIVE:  See eval  9/7: Doing good. Lateral hip still sore  9/14/22:  Patient reports hip is still sore, with the entire length of the IT band sore. 9/20/22 pt stated \"the leg is slowly getting strengthened up. \"  9/22/22 pt has minimal soreness following PT sessions    OBJECTIVE:  Observation:   SLS: L fair, R poor (less than 2 seconds)  Palpation: L rectus lateralis, L distal ITB, L TFL  Gait: (include devices/WB status) decreased trunk rotation L, decreased L weight shift          9/14/22:  Patient demonstrates increased circumduction L LE during swing, Trendelenberg gait  Stair climbing: inconsistent- sometimes alternated, sometimes stepped up with just one leg, guarded    Test measurements:    ROM LEFT  9/1/22   HIP Flex WFL   HIP Abd WFL, groin pain   HIP Ext     HIP IR     HIP ER     Strength  LEFT   HIP Flexors 4-/5   Hip Adductors NT   HIP Abductors 3-/5  10.0#  C/o groin pain   HIP Ext NT    Hip ER NT    Knee EXT (quad) 4/5, pain  29.5#   Knee Flex (HS) 4+/5  30.4#   Tone  Quads  Glutes    Fair  Fair, delayed       RESTRICTIONS/PRECAUTIONS: PMH 2 GUILHERME, most recent 5/22    Exercises/Interventions:     Therapeutic Ex (19698)   Min: 15 Reps/Resistance Notes/CUES   Nu Step Level 1 x 5' Seat, UEs #6   Stretch gastrocs  Stretch hamstrings 3x30\"  3x30\"         Heel raises  Minisquats 2x10  2x10    Supine ABD  Fall outs, small range 2# 10x2  10x On slide board   Bridge 10x, 3\" hold    SLABD  Clamshells 2x10, min A for guidance  2x10, 3\" hold    Manual Intervention (19011)  Min:15     STM L TFL, L distal ITB Entire length, mod aggressive R side lying                            NMR re-education (19364)  Min:15  CUES NEEDED   JEANIE  Knee flexion  Knee extension Settings 8 and 9, 10x each    Standing R SLS  L step to (focus R stance)  ABD  EXT 10x, 5\" hold R/L  10x R/L  10x R/L  10x R/L Held 2 rails, focus R glut set   Step up R LE 2\" X10 L  4\" 10x L         Therapeutic Activity (53761)  Min:               Modalities  Min:     IFC with      CP after exercises     MH after exercises            Other Therapeutic Activities: Pt was educated on PT POC, Diagnosis, Prognosis, pathomechanics as well as frequency and duration of scheduling future physical therapy appointments. Time was also taken on this day to answer all patient questions and participation in PT. Reviewed appointment policy in detail with patient and patient verbalized understanding. Home Exercise Program: Patient was instructed in the following for HEP:          Access Code: [email or web address]  URL: [email or web address]  Date: 09/22/2022- reviewed  Prepared by: Phoenix Morrissey    Exercises  Supine Hip Adduction Isometric with [employer] - 2 x daily - 7 x weekly - 10 reps - 10 hold  Standing Single Leg Stance with Counter Support - 1 x daily - 7 x weekly - 10 reps - 5 hold  Standing Hip Abduction with Counter Support - 1 x daily - 7 x weekly - 10 reps  Standing Heel Raises - 1 x daily - 7 x weekly - 20 reps  Mini Squat with Counter Support - 1 x daily - 7 x weekly - 10 reps  Supine Bridge - 1 x daily - 7 x weekly - 10 reps - 5 hold      9/14/22:   Patient instructed in step thru with focus on recruiting glut, stopping Trendelenberg; written instructions with pictures issued, patient able to demonstrate exercises.        Therapeutic Exercise and NMR EXR  [] (45107) Provided verbal/tactile cueing for activities related to strengthening, flexibility, endurance, ROM for improvements in LE, proximal hip, and core control with self care, mobility, lifting, ambulation.  [] (49255) Provided verbal/tactile cueing for activities related to improving balance, coordination, kinesthetic sense, posture, motor skill, proprioception  to assist with LE, proximal hip, and core control in self care, mobility, lifting, ambulation and eccentric single leg control. NMR and Therapeutic Activities:    [] (49347 or 36996) Provided verbal/tactile cueing for activities related to improving balance, coordination, kinesthetic sense, posture, motor skill, proprioception and motor activation to allow for proper function of core, proximal hip and LE with self care and ADLs and functional mobility.   [] (41463) Gait Re-education- Provided training and instruction to the patient for proper LE, core and proximal hip recruitment and positioning and eccentric body weight control with ambulation re-education including up and down stairs     Home Exercise Program:    [x] (69897) Reviewed/Progressed HEP activities related to strengthening, flexibility, endurance, ROM of core, proximal hip and LE for functional self-care, mobility, lifting and ambulation/stair navigation   [] (59265)Reviewed/Progressed HEP activities related to improving balance, coordination, kinesthetic sense, posture, motor skill, proprioception of core, proximal hip and LE for self care, mobility, lifting, and ambulation/stair navigation      Manual Treatments:  PROM / STM / Oscillations-Mobs:  G-I, II, III, IV (PA's, Inf., Post.)  [] (82681) Provided manual therapy to mobilize LE, proximal hip and/or LS spine soft tissue/joints for the purpose of modulating pain, promoting relaxation,  increasing ROM, reducing/eliminating soft tissue swelling/inflammation/restriction, improving soft tissue extensibility and allowing for proper ROM for normal function with self care, mobility, lifting and ambulation.      Charges:  Timed Code Treatment Minutes: 45   Total Treatment Minutes: 45      [] EVAL (LOW) 90395 (typically 20 minutes face-to-face)  [] EVAL (MOD) 80507 (typically 30 minutes face-to-face)  [] EVAL (HIGH) 32799 (typically 45 minutes face-to-face)  [] RE-EVAL     [x] AR(87078) x     [] Dry needle 1 or 2 Muscles (45629)  [x] NMR (29384) x     [] Dry needle 3+ Muscles (38581)  [x] Manual (00004) x     [] Ultrasound (25667) x  [] TA (16020) x     [] Mech Traction (27433)  [] ES(attended) (14501)     [] ES (un) (11400):   [] Vasopump (61509) [] Ionto (52231)   [] Other:    GOALS:  Patient stated goal: \"exercise and stretching\"  [] Progressing: [] Met: [] Not Met: [] Adjusted     Therapist goals for Patient:   Short Term Goals: To be achieved in: 2 weeks  1. Independent in HEP and progression per patient tolerance, in order to prevent re-injury. [] Progressing: [] Met: [] Not Met: [] Adjusted  2. Patient will have a decrease in pain to facilitate improvement in movement, function, and ADLs as indicated by Functional Deficits. [] Progressing: [] Met: [] Not Met: [] Adjusted     Long Term Goals: To be achieved in: 6 weeks  1. FOTO LE will score 70 to assist with reaching prior level of function. [] Progressing: [] Met: [] Not Met: [] Adjusted  2. Patient will demonstrate increased AROM to full and painless to allow for proper joint functioning as indicated by patients Functional Deficits. [] Progressing: [] Met: [] Not Met: [] Adjusted  3. Patient will demonstrate an increase in Strength L quads to good proximal hip strength and control, within 5lb HHD in LE to allow for proper functional mobility as indicated by patients Functional Deficits. [] Progressing: [] Met: [] Not Met: [] Adjusted  4. Patient will return to taking 1 mile walks for fitness without increased symptoms or restriction. [] Progressing: [] Met: [] Not Met: [] Adjusted  5. Patient will be able to get out of a chair without difficulty and without L hip and thigh pain. [] Progressing: [] Met: [] Not Met: [] Adjusted         ASSESSMENT:  9/7: Patient hypersensitive over several spots of her L thigh/hip. Light touch elicits a jumping response at these spots.  Patient reports an \"electrifying\" sensation when touched    Treatment/Activity Tolerance:  [x] Patient tolerated treatment well [] Patient limited by fatique  [] Patient limited by pain  [] Patient limited by other medical complications  [] Other:     Overall Progression Towards Functional goals/ Treatment Progress Update:  [] Patient is progressing as expected towards functional goals listed. [] Progression is slowed due to complexities/Impairments listed. [] Progression has been slowed due to co-morbidities. [x] Plan just implemented, too soon to assess goals progression <30days   [] Goals require adjustment due to lack of progress  [] Patient is not progressing as expected and requires additional follow up with physician  [] Other    Prognosis for POC: [x] Good [] Fair  [] Poor    Patient requires continued skilled intervention: [x] Yes  [] No        PLAN:   [] Continue per plan of care [] Alter current plan (see comments)  [x] Plan of care initiated [] Hold pending MD visit [] Discharge    Electronically signed by: Jermaine Briggs PT       Note: If patient does not return for scheduled/recommended follow up visits, this note will serve as a discharge from care along with the most recent update on progress.

## 2022-09-27 ENCOUNTER — HOSPITAL ENCOUNTER (OUTPATIENT)
Dept: PHYSICAL THERAPY | Age: 62
Setting detail: THERAPIES SERIES
Discharge: HOME | End: 2022-09-27
Payer: MEDICARE

## 2022-09-27 PROCEDURE — 97140 MANUAL THERAPY 1/> REGIONS: CPT

## 2022-09-27 PROCEDURE — 97112 NEUROMUSCULAR REEDUCATION: CPT

## 2022-09-27 PROCEDURE — 97110 THERAPEUTIC EXERCISES: CPT

## 2022-09-27 NOTE — FLOWSHEET NOTE
[facility] - Outpatient Rehabilitation and Therapy, [address] [place] [address]  Phone: [phone]   Fax:     [phone]      Physical Therapy Treatment Note/ Progress Report:     Date:  2022    Patient Name:  Etienne Mcgovern    :  1960  MRN: [id number]    Pertinent Medical History:     Evaluation Date: 2022                                                   Medical Diagnosis:  Presence of left artificial hip joint [Z96.642]     Treatment Diagnosis:      ICD-10-CM     1. Decreased functional mobility  R26.89                                      Insurance information: PT Insurance Information: [employer]; 16 visits thru 22 approved via [employer]    Physician Information:  [address] of care signed (Y/N): Yes    Date of Patient follow up with Physician:      Progress Report: []  Yes  [x]  No     Date Range for reporting period:  Beginnin22  Ending:      Progress report due (10 Rx/or 30 days whichever is less):      Recertification due (POC duration/ or 90 days whichever is less):      Visit # POC/Insurance Allowable Auth Needed   6  [x]Yes   []No     Latex Allergy:  [x]NO      []YES  Preferred Language for Healthcare:   [x]English       []other:  Functional Scale: FOTO HIP = 22 62; 22 65    Pain level:  2/10 L anterolateral thigh     History of Injury: Patient stated she has had 2 GUILHERME for L LE, the most recent 2022. Pt stated she had an anterior hip procedure. Pt stated her doctor informed her that she has no restrictions. Pt had home PT thru . Pt has been working with her HEP from home PT. Currently pt has pain after she sits and starts to stand and walk. Pt stated the first 4 steps are challenging \"and then it loosens up. \"  Pt does not have pain when she is sitting. Pt has not taken long walks because \"I don't trust it yet. \" Pt stated when she is cleaning and doing laundry, duties where she must kneel and then get back up to standing, that she has weakness and stiffness and has difficulty getting back up to a standing position. Pt stated her balance \"can be improved. \"      SUBJECTIVE:  See yaima  9/7: Doing good. Lateral hip still sore  9/14/22:  Patient reports hip is still sore, with the entire length of the IT band sore. 9/20/22 pt stated \"the leg is slowly getting strengthened up. \"  9/22/22 pt has minimal soreness following PT sessions  9/27/22 pain down the leg is not longer there; pain continues when first getting out of chair to walk    OBJECTIVE:  Observation:   9/1/22  SLS: L fair, R poor (less than 2 seconds)  Palpation: L rectus lateralis, L distal ITB, L TFL  Gait: (include devices/WB status) decreased trunk rotation L, decreased L weight shift  Stair climbing: inconsistent- sometimes alternated, sometimes stepped up with just one leg, guarded    9/14/22  Gait: Patient demonstrates increased circumduction L LE during swing, Trendelenberg gait     9/27/22   SLS: L 10 seconds, but unsteady  Palpation: L rectus lateralis, L distal ITB, L TFL  Gait: (include devices/WB status) symmetrical trunk rotation, improved L weight shift  Stair climbing: alternated ascending and descending      Test measurements:    ROM LEFT  9/1/22 Left  9/27/22   HIP Flex WFL    HIP Abd WFL, groin pain WFL, discomfort of groin   HIP Ext NT     HIP IR NT     HIP ER NT     Strength  LEFT    HIP Flexors 4-/5 4+/5   Hip Adductors NT    HIP Abductors 3-/5  10.0#  C/o groin pain 4/5  22.9#   HIP Ext NT     Hip ER NT     Knee EXT (quad) 4/5, pain  29.5# 5/5  49.8#   Knee Flex (HS) 4+/5  30.4# 5/5  45.9#   Tone  Quads  Glutes    Fair  Fair, delayed        RESTRICTIONS/PRECAUTIONS: PMH 2 GUILHERME, most recent 5/22    Exercises/Interventions:     Therapeutic Ex (93849)   Min: 15 Reps/Resistance Notes/CUES   Nu Step Level 1 x 5' Seat, UEs #6   Stretch gastrocs  Stretch hamstrings 3x30\"  3x30\"         Heel raises  Minisquats 2x10  2x10    Supine ABD  Fall outs, small range 2# 10x2  10x On slide board   Bridge with ADD set  Bridge with ABD set Begin  begin    SLABD  Clamshells 2x10, min A for guidance  2x10, 3\" hold    Manual Intervention ([id number] [place] [address])  Min:15     R side lying   STM L rectus femoris 15' After STM of rectus pt no longer had groin pain with AROM hip ABD                       NMR re-education (98008)  Min:15  CUES NEEDED   JEANIE  Knee flexion  Knee extension Settings 8 and 9, 10x each    Concentric quad unilateral L 5 kg 10x, 10 kg 10x    Standing R SLS  L step to (focus R stance)  ABD  EXT 10x, 5\" hold R/L  10x R/L  10x R/L  10x R/L Held 2 rails, focus R glut set        Step up R LE 4\" 20x L         Therapeutic Activity (86047)  Min:               Modalities  Min:     IFC with      CP after exercises     MH after exercises            Other Therapeutic Activities: Pt was educated on PT POC, Diagnosis, Prognosis, pathomechanics as well as frequency and duration of scheduling future physical therapy appointments. Time was also taken on this day to answer all patient questions and participation in PT. Reviewed appointment policy in detail with patient and patient verbalized understanding. Home Exercise Program: Patient was instructed in the following for HEP:          Access Code: [email or web address]  URL: [email or web address]  Date: 09/22/2022- revised  Prepared by: Raj Cotton    Exercises  Supine Hip Adduction Isometric with [place] - 2 x daily - 7 x weekly - 10 reps - 10 hold  Standing Single Leg Stance with Counter Support - 1 x daily - 7 x weekly - 10 reps - 5 hold  Standing Hip Abduction with Counter Support - 1 x daily - 7 x weekly - 10 reps  Standing Heel Raises - 1 x daily - 7 x weekly - 20 reps  Mini Squat with Counter Support - 1 x daily - 7 x weekly - 10 reps  Supine Bridge - 1 x daily - 7 x weekly - 10 reps - 5 hold      9/14/22:   Patient instructed in step thru with focus on recruiting glut, stopping Trendelenberg; written instructions with pictures issued, patient able to demonstrate exercises. Therapeutic Exercise and NMR EXR  [] (55475) Provided verbal/tactile cueing for activities related to strengthening, flexibility, endurance, ROM for improvements in LE, proximal hip, and core control with self care, mobility, lifting, ambulation.  [] (64845) Provided verbal/tactile cueing for activities related to improving balance, coordination, kinesthetic sense, posture, motor skill, proprioception  to assist with LE, proximal hip, and core control in self care, mobility, lifting, ambulation and eccentric single leg control.      NMR and Therapeutic Activities:    [] (33828 or 92158) Provided verbal/tactile cueing for activities related to improving balance, coordination, kinesthetic sense, posture, motor skill, proprioception and motor activation to allow for proper function of core, proximal hip and LE with self care and ADLs and functional mobility.   [] (90996) Gait Re-education- Provided training and instruction to the patient for proper LE, core and proximal hip recruitment and positioning and eccentric body weight control with ambulation re-education including up and down stairs     Home Exercise Program:    [x] (30813) Reviewed/Progressed HEP activities related to strengthening, flexibility, endurance, ROM of core, proximal hip and LE for functional self-care, mobility, lifting and ambulation/stair navigation   [] (84154)Reviewed/Progressed HEP activities related to improving balance, coordination, kinesthetic sense, posture, motor skill, proprioception of core, proximal hip and LE for self care, mobility, lifting, and ambulation/stair navigation      Manual Treatments:  PROM / STM / Oscillations-Mobs:  G-I, II, III, IV (PA's, Inf., Post.)  [] (27465) Provided manual therapy to mobilize LE, proximal hip and/or LS spine soft tissue/joints for the purpose of modulating pain, promoting relaxation, increasing ROM, reducing/eliminating soft tissue swelling/inflammation/restriction, improving soft tissue extensibility and allowing for proper ROM for normal function with self care, mobility, lifting and ambulation. Charges:  Timed Code Treatment Minutes: 45   Total Treatment Minutes: 45      [] EVAL (LOW) 10415 (typically 20 minutes face-to-face)  [] EVAL (MOD) 71004 (typically 30 minutes face-to-face)  [] EVAL (HIGH) 55875 (typically 45 minutes face-to-face)  [] RE-EVAL     [x] AW(23032) x     [] Dry needle 1 or 2 Muscles (42930)  [x] NMR (20083) x     [] Dry needle 3+ Muscles (06799)  [x] Manual (48107) x     [] Ultrasound (78074) x  [] TA (39395) x     [] Mech Traction (85617)  [] ES(attended) (04254)     [] ES (un) (94017):   [] Vasopump (61349) [] Ionto (24853)   [] Other:    GOALS:  Patient stated goal: \"exercise and stretching\"  [] Progressing: [] Met: [] Not Met: [] Adjusted     Therapist goals for Patient:   Short Term Goals: To be achieved in: 2 weeks  1. Independent in HEP and progression per patient tolerance, in order to prevent re-injury. [] Progressing: [] Met: [] Not Met: [] Adjusted  2. Patient will have a decrease in pain to facilitate improvement in movement, function, and ADLs as indicated by Functional Deficits. [] Progressing: [] Met: [] Not Met: [] Adjusted     Long Term Goals: To be achieved in: 6 weeks  1. FOTO LE will score 70 to assist with reaching prior level of function. [] Progressing: [] Met: [] Not Met: [] Adjusted  2. Patient will demonstrate increased AROM to full and painless to allow for proper joint functioning as indicated by patients Functional Deficits. [] Progressing: [] Met: [] Not Met: [] Adjusted  3. Patient will demonstrate an increase in Strength L quads to good proximal hip strength and control, within 5lb HHD in LE to allow for proper functional mobility as indicated by patients Functional Deficits. [] Progressing: [] Met: [] Not Met: [] Adjusted  4. Patient will return to taking 1 mile walks for fitness without increased symptoms or restriction. [] Progressing: [] Met: [] Not Met: [] Adjusted  5. Patient will be able to get out of a chair without difficulty and without L hip and thigh pain. [] Progressing: [] Met: [] Not Met: [] Adjusted         ASSESSMENT:  9/7: Patient hypersensitive over several spots of her L thigh/hip. Light touch elicits a jumping response at these spots. Patient reports an \"electrifying\" sensation when touched    Treatment/Activity Tolerance:  [x] Patient tolerated treatment well [] Patient limited by fatique  [] Patient limited by pain  [] Patient limited by other medical complications  [] Other:     Overall Progression Towards Functional goals/ Treatment Progress Update:  [] Patient is progressing as expected towards functional goals listed. [] Progression is slowed due to complexities/Impairments listed. [] Progression has been slowed due to co-morbidities. [x] Plan just implemented, too soon to assess goals progression <30days   [] Goals require adjustment due to lack of progress  [] Patient is not progressing as expected and requires additional follow up with physician  [] Other    Prognosis for POC: [x] Good [] Fair  [] Poor    Patient requires continued skilled intervention: [x] Yes  [] No        PLAN:   [] Continue per plan of care [] Alter current plan (see comments)  [x] Plan of care initiated [] Hold pending MD visit [] Discharge    Electronically signed by: Leydi Galvin PT       Note: If patient does not return for scheduled/recommended follow up visits, this note will serve as a discharge from care along with the most recent update on progress.

## 2022-09-29 ENCOUNTER — APPOINTMENT (OUTPATIENT)
Dept: PHYSICAL THERAPY | Age: 62
End: 2022-09-29
Payer: MEDICARE

## 2022-10-03 ENCOUNTER — OFFICE VISIT (OUTPATIENT)
Dept: ORTHOPEDIC SURGERY | Age: 62
End: 2022-10-03
Payer: MEDICARE

## 2022-10-03 VITALS — HEIGHT: 63 IN | RESPIRATION RATE: 18 BRPM | WEIGHT: 142 LBS | BODY MASS INDEX: 25.16 KG/M2

## 2022-10-03 DIAGNOSIS — Z96.642 H/O TOTAL HIP ARTHROPLASTY, LEFT: Primary | ICD-10-CM

## 2022-10-03 PROCEDURE — 1036F TOBACCO NON-USER: CPT | Performed by: ORTHOPAEDIC SURGERY

## 2022-10-03 PROCEDURE — G8419 CALC BMI OUT NRM PARAM NOF/U: HCPCS | Performed by: ORTHOPAEDIC SURGERY

## 2022-10-03 PROCEDURE — G8427 DOCREV CUR MEDS BY ELIG CLIN: HCPCS | Performed by: ORTHOPAEDIC SURGERY

## 2022-10-03 PROCEDURE — 99213 OFFICE O/P EST LOW 20 MIN: CPT | Performed by: ORTHOPAEDIC SURGERY

## 2022-10-03 PROCEDURE — G8484 FLU IMMUNIZE NO ADMIN: HCPCS | Performed by: ORTHOPAEDIC SURGERY

## 2022-10-03 PROCEDURE — 3017F COLORECTAL CA SCREEN DOC REV: CPT | Performed by: ORTHOPAEDIC SURGERY

## 2022-10-03 RX ORDER — AMOXICILLIN 500 MG/1
CAPSULE ORAL
Qty: 4 CAPSULE | Refills: 1 | Status: SHIPPED | OUTPATIENT
Start: 2022-10-03

## 2022-10-03 NOTE — PROGRESS NOTES
[facility]  Office Visit    Chief Complaint: Follow-up s/p conversion to left total hip arthroplasty    HPI:  Chandan Dowell is a 65 y.o. who is here in follow-up of left total hip arthroplasty performed on 5/18/2022. She underwent revision from hemiarthroplasty to total hip arthroplasty via anterior approach. She is doing well postoperatively. She walks without assistive device. She is doing well overall, but does report discomfort down the. This is related to have burning pain goes down the thigh. She has a bit of groin pain when getting up from a seated position as well. She has completed formal physical therapy and is doing home exercises. Patient Active Problem List   Diagnosis    Chronic back pain    Home accident    Depression    Esophageal reflux    Acute blood loss anemia    Hip joint replacement by other means    Hip pain    Strain of hip flexor    Closed nondisplaced fracture of left patella    Left knee pain    Closed displaced transverse fracture of patella with routine healing    Disorder of bone and cartilage    Carpal tunnel syndrome    Brachial neuritis    Atrophic vaginitis    Postmenopausal osteoporosis       ROS:  Constitutional: denies fever, chills, weight loss  MSK: denies pain in other joints, muscle aches  Neurological: denies numbness, tingling, weakness    Exam:  Appearance: sitting in exam room chair, appears to be in no acute distress, awake and alert  Resp: unlabored breathing on room air  Skin: warm, dry and intact with out erythema or significant increased temperature  Neuro: grossly intact both lower extremities. Intact sensation to light touch. Motor exam 4+ to 5/5 in all major motor groups. Left hip: Incision is healed. Examination demonstrates negative logroll and negative Stinchfield. There is brisk capillary refill. Strength is 5/5 in hamstrings, quads, hip flexors.     Imaging:  AP pelvis, AP and frog-leg lateral views of the left hip were performed and interpreted today. There is a total hip arthroplasty in place with no signs of osteolysis, loosening, fracture. Assessment:  S/p left total hip arthroplasty, conversion from hemiarthroplasty    Plan:  She is doing well postoperatively. We discussed her lateral femoral cutaneous nerve symptoms. I prescribed a biomed compound cream containing gabapentin to help with her nerve symptoms. She will continue home physical therapy exercises. We discussed with the patient today the use of antibiotic prophylaxis prior to any dental procedures. We discussed that the antibiotic prophylaxis would be used to help prevent periprosthetic joint infections. If they were not offered antibiotics by the physician performing the procedure, they should contact our office that we may prescribe them antibiotics. Follow-up in May 2023 for annual visit or sooner if needed. Total time spent on today's encounter was at least 23 minutes. This time included reviewing prior notes, radiographs, and lab results when available, reviewing history obtained by medical assistant, performing history and physical exam, reviewing tests/radiographs with the patient, counseling the patient, ordering medications or tests, documentation in the electronic health record, and coordination of care. This dictation was done with Dragon dictation and may contain mechanical errors related to translation.

## 2022-10-05 RX ORDER — TRAZODONE HYDROCHLORIDE 50 MG/1
TABLET ORAL
Qty: 60 TABLET | Refills: 3 | Status: SHIPPED | OUTPATIENT
Start: 2022-10-05

## 2022-10-21 DIAGNOSIS — Z01.00 EYE EXAM, ROUTINE: Primary | ICD-10-CM

## 2022-11-16 RX ORDER — TIZANIDINE 4 MG/1
4 TABLET ORAL 4 TIMES DAILY PRN
Qty: 40 TABLET | Refills: 0 | Status: SHIPPED | OUTPATIENT
Start: 2022-11-16

## 2022-12-07 RX ORDER — TIZANIDINE 4 MG/1
TABLET ORAL
Qty: 40 TABLET | Refills: 0 | Status: SHIPPED | OUTPATIENT
Start: 2022-12-07

## 2023-01-13 RX ORDER — TIZANIDINE 4 MG/1
TABLET ORAL
Qty: 40 TABLET | Refills: 0 | Status: SHIPPED | OUTPATIENT
Start: 2023-01-13

## 2023-02-02 RX ORDER — TRAZODONE HYDROCHLORIDE 50 MG/1
TABLET ORAL
Qty: 60 TABLET | Refills: 3 | OUTPATIENT
Start: 2023-02-02

## 2023-02-02 RX ORDER — TIZANIDINE 4 MG/1
TABLET ORAL
Qty: 40 TABLET | Refills: 0 | Status: SHIPPED | OUTPATIENT
Start: 2023-02-02

## 2023-02-02 RX ORDER — TRAZODONE HYDROCHLORIDE 50 MG/1
TABLET ORAL
Qty: 60 TABLET | Refills: 3 | Status: SHIPPED | OUTPATIENT
Start: 2023-02-02

## 2023-03-21 RX ORDER — OXYBUTYNIN CHLORIDE 5 MG/1
TABLET ORAL
Qty: 90 TABLET | Refills: 0 | Status: SHIPPED | OUTPATIENT
Start: 2023-03-21

## 2023-03-31 ENCOUNTER — PATIENT MESSAGE (OUTPATIENT)
Dept: ORTHOPEDIC SURGERY | Age: 63
End: 2023-03-31

## 2023-03-31 RX ORDER — AMOXICILLIN 500 MG/1
CAPSULE ORAL
Qty: 4 CAPSULE | Refills: 1 | Status: SHIPPED | OUTPATIENT
Start: 2023-03-31

## 2023-03-31 NOTE — TELEPHONE ENCOUNTER
From: Stefano Sorenson  To: Dr. Vinita Serra: 3/31/2023 10:21 AM EDT  Subject: ANTIBIOTIC    Gd morning, my dentist office wants to know if I should take an antibiotic before my dental cleaning coming up due to the hip surgery from last year if so, it can be sent to [facility], or I can go thru my primary physician. Thank you.

## 2023-05-22 ENCOUNTER — OFFICE VISIT (OUTPATIENT)
Dept: ORTHOPEDIC SURGERY | Age: 63
End: 2023-05-22

## 2023-05-22 VITALS — HEIGHT: 63 IN | RESPIRATION RATE: 16 BRPM | BODY MASS INDEX: 25.16 KG/M2 | WEIGHT: 142 LBS

## 2023-05-22 DIAGNOSIS — Z96.642 H/O TOTAL HIP ARTHROPLASTY, LEFT: Primary | ICD-10-CM

## 2023-05-26 NOTE — PROGRESS NOTES
This dictation was done with Dragon dictation and may contain mechanical errors related to translation. Resp. rate 16, height 5' 3\" (1.6 m), weight 142 lb (64.4 kg). This a very pleasant 58-year-old female who is seen and had a conversion from hemiarthroplasty to a total hip replacement 5/18/2022 and her left hip. She is here for her 1 year follow-up and is happy with her overall progress. She has very little pain sometimes laterally she has some soreness but it comes and goes and is not consistent. Neurologically she is intact to her left foot. She was sent for x-rays of her left    Three views of the total Hip arthroplasty were done today including an AP pelvis and a 2 view hip. This reveals satisfactory alignment of the prosthesis with good sizing and fit. There is good version of the cup and no subsiding of the stem. . No signs of significant polyethylene wear or failure. No progressive radiolucencies,fractures, tumors or dislocations. With good range of motion good healing incision on good x-rays at 1 year postop on habitus as she has a stable left total hip replacement in good order.     She understands to continue use prophylactic antibiotics and need to follow-up in 1 year for repeat examination and examination and prodrome based on how she responds

## 2023-05-30 RX ORDER — TRAZODONE HYDROCHLORIDE 50 MG/1
TABLET ORAL
Qty: 60 TABLET | Refills: 0 | Status: SHIPPED | OUTPATIENT
Start: 2023-05-30

## 2023-06-01 RX ORDER — OMEPRAZOLE 20 MG/1
CAPSULE, DELAYED RELEASE ORAL
Qty: 90 CAPSULE | Refills: 1 | Status: SHIPPED | OUTPATIENT
Start: 2023-06-01

## 2023-06-19 RX ORDER — OXYBUTYNIN CHLORIDE 5 MG/1
TABLET ORAL
Qty: 90 TABLET | Refills: 0 | Status: SHIPPED | OUTPATIENT
Start: 2023-06-19

## 2023-06-27 ENCOUNTER — HOSPITAL ENCOUNTER (OUTPATIENT)
Dept: WOMENS IMAGING | Age: 63
Discharge: HOME | End: 2023-06-27
Payer: MEDICARE

## 2023-06-27 DIAGNOSIS — Z12.31 VISIT FOR SCREENING MAMMOGRAM: ICD-10-CM

## 2023-06-27 PROCEDURE — 77063 BREAST TOMOSYNTHESIS BI: CPT

## 2023-06-29 RX ORDER — TRAZODONE HYDROCHLORIDE 50 MG/1
TABLET ORAL
Qty: 60 TABLET | Refills: 0 | Status: SHIPPED | OUTPATIENT
Start: 2023-06-29

## 2023-07-10 SDOH — ECONOMIC STABILITY: TRANSPORTATION INSECURITY
IN THE PAST 12 MONTHS, HAS LACK OF TRANSPORTATION KEPT YOU FROM MEETINGS, WORK, OR FROM GETTING THINGS NEEDED FOR DAILY LIVING?: NO

## 2023-07-10 SDOH — ECONOMIC STABILITY: FOOD INSECURITY: WITHIN THE PAST 12 MONTHS, YOU WORRIED THAT YOUR FOOD WOULD RUN OUT BEFORE YOU GOT MONEY TO BUY MORE.: NEVER TRUE

## 2023-07-10 SDOH — ECONOMIC STABILITY: FOOD INSECURITY: WITHIN THE PAST 12 MONTHS, THE FOOD YOU BOUGHT JUST DIDN'T LAST AND YOU DIDN'T HAVE MONEY TO GET MORE.: NEVER TRUE

## 2023-07-10 SDOH — ECONOMIC STABILITY: HOUSING INSECURITY
IN THE LAST 12 MONTHS, WAS THERE A TIME WHEN YOU DID NOT HAVE A STEADY PLACE TO SLEEP OR SLEPT IN A SHELTER (INCLUDING NOW)?: NO

## 2023-07-10 SDOH — HEALTH STABILITY: PHYSICAL HEALTH: ON AVERAGE, HOW MANY MINUTES DO YOU ENGAGE IN EXERCISE AT THIS LEVEL?: 10 MIN

## 2023-07-10 SDOH — HEALTH STABILITY: PHYSICAL HEALTH: ON AVERAGE, HOW MANY DAYS PER WEEK DO YOU ENGAGE IN MODERATE TO STRENUOUS EXERCISE (LIKE A BRISK WALK)?: 0 DAYS

## 2023-07-10 SDOH — ECONOMIC STABILITY: INCOME INSECURITY: HOW HARD IS IT FOR YOU TO PAY FOR THE VERY BASICS LIKE FOOD, HOUSING, MEDICAL CARE, AND HEATING?: NOT VERY HARD

## 2023-07-10 ASSESSMENT — LIFESTYLE VARIABLES
HOW OFTEN DURING THE LAST YEAR HAVE YOU BEEN UNABLE TO REMEMBER WHAT HAPPENED THE NIGHT BEFORE BECAUSE YOU HAD BEEN DRINKING: NEVER
HOW MANY STANDARD DRINKS CONTAINING ALCOHOL DO YOU HAVE ON A TYPICAL DAY: 2
HOW OFTEN DURING THE LAST YEAR HAVE YOU HAD A FEELING OF GUILT OR REMORSE AFTER DRINKING: 0
HOW OFTEN DO YOU HAVE A DRINK CONTAINING ALCOHOL: 3
HOW OFTEN DURING THE LAST YEAR HAVE YOU FAILED TO DO WHAT WAS NORMALLY EXPECTED FROM YOU BECAUSE OF DRINKING: 0
HOW OFTEN DURING THE LAST YEAR HAVE YOU NEEDED AN ALCOHOLIC DRINK FIRST THING IN THE MORNING TO GET YOURSELF GOING AFTER A NIGHT OF HEAVY DRINKING: NEVER
HOW OFTEN DURING THE LAST YEAR HAVE YOU FAILED TO DO WHAT WAS NORMALLY EXPECTED FROM YOU BECAUSE OF DRINKING: NEVER
HAVE YOU OR SOMEONE ELSE BEEN INJURED AS A RESULT OF YOUR DRINKING: 0
HAVE YOU OR SOMEONE ELSE BEEN INJURED AS A RESULT OF YOUR DRINKING: NO
HAS A RELATIVE, FRIEND, DOCTOR, OR ANOTHER HEALTH PROFESSIONAL EXPRESSED CONCERN ABOUT YOUR DRINKING OR SUGGESTED YOU CUT DOWN: 0
HOW OFTEN DURING THE LAST YEAR HAVE YOU BEEN UNABLE TO REMEMBER WHAT HAPPENED THE NIGHT BEFORE BECAUSE YOU HAD BEEN DRINKING: 0
HOW OFTEN DURING THE LAST YEAR HAVE YOU FOUND THAT YOU WERE NOT ABLE TO STOP DRINKING ONCE YOU HAD STARTED: 0
HOW OFTEN DO YOU HAVE A DRINK CONTAINING ALCOHOL: 2-4 TIMES A MONTH
HOW OFTEN DURING THE LAST YEAR HAVE YOU FOUND THAT YOU WERE NOT ABLE TO STOP DRINKING ONCE YOU HAD STARTED: NEVER
HOW OFTEN DURING THE LAST YEAR HAVE YOU NEEDED AN ALCOHOLIC DRINK FIRST THING IN THE MORNING TO GET YOURSELF GOING AFTER A NIGHT OF HEAVY DRINKING: 0
HAS A RELATIVE, FRIEND, DOCTOR, OR ANOTHER HEALTH PROFESSIONAL EXPRESSED CONCERN ABOUT YOUR DRINKING OR SUGGESTED YOU CUT DOWN: NO
HOW OFTEN DO YOU HAVE SIX OR MORE DRINKS ON ONE OCCASION: 2
HOW MANY STANDARD DRINKS CONTAINING ALCOHOL DO YOU HAVE ON A TYPICAL DAY: 3 OR 4
HOW OFTEN DURING THE LAST YEAR HAVE YOU HAD A FEELING OF GUILT OR REMORSE AFTER DRINKING: NEVER

## 2023-07-10 ASSESSMENT — PATIENT HEALTH QUESTIONNAIRE - PHQ9
SUM OF ALL RESPONSES TO PHQ QUESTIONS 1-9: 0
SUM OF ALL RESPONSES TO PHQ9 QUESTIONS 1 & 2: 0
2. FEELING DOWN, DEPRESSED OR HOPELESS: 0
1. LITTLE INTEREST OR PLEASURE IN DOING THINGS: 0
SUM OF ALL RESPONSES TO PHQ QUESTIONS 1-9: 0

## 2023-07-11 ENCOUNTER — OFFICE VISIT (OUTPATIENT)
Dept: FAMILY MEDICINE CLINIC | Age: 63
End: 2023-07-11

## 2023-07-11 VITALS
BODY MASS INDEX: 25.45 KG/M2 | DIASTOLIC BLOOD PRESSURE: 78 MMHG | TEMPERATURE: 98.1 F | HEART RATE: 94 BPM | HEIGHT: 63 IN | WEIGHT: 143.6 LBS | SYSTOLIC BLOOD PRESSURE: 110 MMHG | OXYGEN SATURATION: 97 %

## 2023-07-11 DIAGNOSIS — Z13.220 SCREENING FOR HYPERLIPIDEMIA: ICD-10-CM

## 2023-07-11 DIAGNOSIS — H93.8X1 SENSATION OF FULLNESS IN RIGHT EAR: ICD-10-CM

## 2023-07-11 DIAGNOSIS — Z12.11 COLON CANCER SCREENING: ICD-10-CM

## 2023-07-11 DIAGNOSIS — Z23 NEED FOR PROPHYLACTIC VACCINATION AGAINST DIPHTHERIA-TETANUS-PERTUSSIS (DTP): ICD-10-CM

## 2023-07-11 DIAGNOSIS — Z00.00 INITIAL MEDICARE ANNUAL WELLNESS VISIT: Primary | ICD-10-CM

## 2023-07-11 ASSESSMENT — PATIENT HEALTH QUESTIONNAIRE - PHQ9
SUM OF ALL RESPONSES TO PHQ QUESTIONS 1-9: 0
5. POOR APPETITE OR OVEREATING: 0
10. IF YOU CHECKED OFF ANY PROBLEMS, HOW DIFFICULT HAVE THESE PROBLEMS MADE IT FOR YOU TO DO YOUR WORK, TAKE CARE OF THINGS AT HOME, OR GET ALONG WITH OTHER PEOPLE: 0
SUM OF ALL RESPONSES TO PHQ QUESTIONS 1-9: 0
8. MOVING OR SPEAKING SO SLOWLY THAT OTHER PEOPLE COULD HAVE NOTICED. OR THE OPPOSITE, BEING SO FIGETY OR RESTLESS THAT YOU HAVE BEEN MOVING AROUND A LOT MORE THAN USUAL: 0
SUM OF ALL RESPONSES TO PHQ QUESTIONS 1-9: 0
SUM OF ALL RESPONSES TO PHQ9 QUESTIONS 1 & 2: 0
2. FEELING DOWN, DEPRESSED OR HOPELESS: 0
7. TROUBLE CONCENTRATING ON THINGS, SUCH AS READING THE NEWSPAPER OR WATCHING TELEVISION: 0
9. THOUGHTS THAT YOU WOULD BE BETTER OFF DEAD, OR OF HURTING YOURSELF: 0
6. FEELING BAD ABOUT YOURSELF - OR THAT YOU ARE A FAILURE OR HAVE LET YOURSELF OR YOUR FAMILY DOWN: 0
SUM OF ALL RESPONSES TO PHQ QUESTIONS 1-9: 0
3. TROUBLE FALLING OR STAYING ASLEEP: 0
1. LITTLE INTEREST OR PLEASURE IN DOING THINGS: 0
4. FEELING TIRED OR HAVING LITTLE ENERGY: 0

## 2023-07-11 NOTE — PATIENT INSTRUCTIONS
Advance Directives: Care Instructions  Overview  An advance directive is a legal way to state your wishes at the end of your life. It tells your family and your doctor what to do if you can't say what you want. There are two main types of advance directives. You can change them any time your wishes change. Living will. This form tells your family and your doctor your wishes about life support and other treatment. The form is also called a declaration. Medical power of . This form lets you name a person to make treatment decisions for you when you can't speak for yourself. This person is called a health care agent (health care proxy, health care surrogate). The form is also called a durable power of  for health care. If you do not have an advance directive, decisions about your medical care may be made by a family member, or by a doctor or a  who doesn't know you. It may help to think of an advance directive as a gift to the people who care for you. If you have one, they won't have to make tough decisions by themselves. For more information, including forms for your state, see the [address] [place] [address] website (www.caringinfo.org/planning/advance-directives/). Follow-up care is a key part of your treatment and safety. Be sure to make and go to all appointments, and call your doctor if you are having problems. It's also a good idea to know your test results and keep a list of the medicines you take. What should you include in an advance directive? Many states have a unique advance directive form. (It may ask you to address specific issues.) Or you might use a universal form that's approved by many states. If your form doesn't tell you what to address, it may be hard to know what to include in your advance directive. Use the questions below to help you get started. Who do you want to make decisions about your medical care if you are not able to?   What life-support measures do you want if you

## 2023-07-11 NOTE — PROGRESS NOTES
Medicare Annual Wellness Visit    Deb Renteria is here for Medicare AWV (AWV. Due for fasting labs and Hep C and HIV. Had a couple sips of diet coke this morning. Due for colonoscopy and tdap  )    Assessment & Plan   Initial Medicare annual wellness visit  Need for prophylactic vaccination against diphtheria-tetanus-pertussis (DTP)  -     Tdap (ADACEL) 5-2-15.5 LF-MCG/0.5 injection; Inject 0.5 mLs into the muscle once for 1 dose, Disp-0.5 mL, R-0Print  Screening for hyperlipidemia  -     Lipid Panel; Future  Colon cancer screening  -     AFL - Miky Herzog MD, Gastroenterology (ERCP & EUS), [facility] [place]  Sensation of fullness in right ear  -use flonase as discussed. Ear assessment negative. Recommendations for Preventive Services Due: see orders and patient instructions/AVS.  Recommended screening schedule for the next 5-10 years is provided to the patient in written form: see Patient Instructions/AVS.     No follow-ups on file. Subjective       Denies sinus congestion, nasal congestion. Right ear fullness, popping. Denies fevers or drainage. Prolia now instead of reclast.     Patient's complete Health Risk Assessment and screening values have been reviewed and are found in Flowsheets. The following problems were reviewed today and where indicated follow up appointments were made and/or referrals ordered. Positive Risk Factor Screenings with Interventions:         Alcohol Screening:  Alcohol Use: Heavy Drinker    Frequency of Alcohol Consumption: 2-4 times a month    Average Number of Drinks: 3 or 4    Frequency of Binge Drinking: Less than monthly      AUDIT-C Score: 4   AUDIT Total Score: 4    Interpretation of AUDIT-C score:   3-7 indicates potential alcohol risk. 8 or more is associated with harmful or hazardous drinking. 15 or more in women, and 15 or more in men, is likely to indicate alcohol dependence.   Interventions:  Patient declined any further intervention or treatment

## 2023-07-25 ENCOUNTER — TELEPHONE (OUTPATIENT)
Dept: FAMILY MEDICINE CLINIC | Age: 63
End: 2023-07-25

## 2023-07-25 DIAGNOSIS — H93.8X1 SENSATION OF FULLNESS IN RIGHT EAR: Primary | ICD-10-CM

## 2023-07-25 NOTE — TELEPHONE ENCOUNTER
[place], and [address] [facility],[address], [address], [address] [place] [address]   Ph: [phone]

## 2023-07-25 NOTE — TELEPHONE ENCOUNTER
----- Message from Karl Hampton sent at 7/24/2023  9:47 PM EDT -----  Regarding: Right ear  Contact: [phone]  Could you please give me a referral to an ENT Dr. Bam Chowdary tried Flonase and other rinses for my right ear, and nothing is helping. It just keeps clogging back up. I think my Eustachian tube may have fluid in it as I don't have allergies.  Thanks

## 2023-07-28 RX ORDER — TRAZODONE HYDROCHLORIDE 50 MG/1
TABLET ORAL
Qty: 60 TABLET | Refills: 0 | Status: SHIPPED | OUTPATIENT
Start: 2023-07-28

## 2023-08-01 ENCOUNTER — OFFICE VISIT (OUTPATIENT)
Dept: ENT CLINIC | Age: 63
End: 2023-08-01
Payer: MEDICARE

## 2023-08-01 VITALS
SYSTOLIC BLOOD PRESSURE: 128 MMHG | WEIGHT: 147 LBS | OXYGEN SATURATION: 98 % | BODY MASS INDEX: 26.05 KG/M2 | HEART RATE: 78 BPM | DIASTOLIC BLOOD PRESSURE: 79 MMHG | RESPIRATION RATE: 16 BRPM | HEIGHT: 63 IN

## 2023-08-01 DIAGNOSIS — H91.93 BILATERAL HEARING LOSS, UNSPECIFIED HEARING LOSS TYPE: ICD-10-CM

## 2023-08-01 DIAGNOSIS — H93.8X1 SENSATION OF FULLNESS IN RIGHT EAR: Primary | ICD-10-CM

## 2023-08-01 DIAGNOSIS — H69.81 DYSFUNCTION OF RIGHT EUSTACHIAN TUBE: ICD-10-CM

## 2023-08-01 PROCEDURE — 3017F COLORECTAL CA SCREEN DOC REV: CPT | Performed by: STUDENT IN AN ORGANIZED HEALTH CARE EDUCATION/TRAINING PROGRAM

## 2023-08-01 PROCEDURE — G8427 DOCREV CUR MEDS BY ELIG CLIN: HCPCS | Performed by: STUDENT IN AN ORGANIZED HEALTH CARE EDUCATION/TRAINING PROGRAM

## 2023-08-01 PROCEDURE — 1036F TOBACCO NON-USER: CPT | Performed by: STUDENT IN AN ORGANIZED HEALTH CARE EDUCATION/TRAINING PROGRAM

## 2023-08-01 PROCEDURE — 99203 OFFICE O/P NEW LOW 30 MIN: CPT | Performed by: STUDENT IN AN ORGANIZED HEALTH CARE EDUCATION/TRAINING PROGRAM

## 2023-08-01 PROCEDURE — G8419 CALC BMI OUT NRM PARAM NOF/U: HCPCS | Performed by: STUDENT IN AN ORGANIZED HEALTH CARE EDUCATION/TRAINING PROGRAM

## 2023-08-01 NOTE — PROGRESS NOTES
Helen Eugene (:  1960) is a 59 y.o. female, here for evaluation of the following chief complaint(s):  Ear Fullness (Right ear)      ASSESSMENT/PLAN:  1. Sensation of fullness in right ear  2. Dysfunction of right eustachian tube  3. Bilateral hearing loss, unspecified hearing loss type      This is a very pleasant 59 y.o. female here today for evaluation of the the above-noted complaints. Patient with left-sided otalgia, aural fullness, popping/clicking, no autophony. Symptoms consistent with left dilatory eustachian tube dysfunction  -Continue fluticasone.  -Auto-insufflate ears (\"pop ears\") 4-5 times daily  -Will consider audiogram to determine any conductive hearing loss  -Have discussed diagnostic myringotomy and/or placement of tympanostomy tube, or Eustachean tube dilation under general anesthesia with patient - will proceed with conservative measures and re-evaluate  -RTC in 6 weeks for further evaluation       Medical Decision Making: The following items were considered in medical decision making:  Independent review of images  Review / order clinical lab tests  Review / order radiology tests  Decision to obtain old records  Review and summation of old records as accessed through [facility] if applicable    SUBJECTIVE/OBJECTIVE:  MALINA Hooks is here today for evaluation of ear issues. The patient states that she has had 3-month consistent right-sided ear fullness and pressure. She feels like she has some associated hearing loss with this. She has difficulty popping her ears and getting them to stay pop. She has no pain associated with this, nor pain with chewing nor headaches. She has tried multiple over-the-counter medications including fluticasone, saline and Sudafed. None of these have really helped.   No history of sinus, ear surgery in the past.          REVIEW OF SYSTEMS  The following systems

## 2023-08-25 RX ORDER — TRAZODONE HYDROCHLORIDE 50 MG/1
TABLET ORAL
Qty: 60 TABLET | Refills: 5 | Status: SHIPPED | OUTPATIENT
Start: 2023-08-25

## 2023-08-30 ENCOUNTER — PATIENT MESSAGE (OUTPATIENT)
Dept: ENT CLINIC | Age: 63
End: 2023-08-30

## 2023-08-31 NOTE — TELEPHONE ENCOUNTER
From: Andreina Gaxiola  To: Dr. Melvi Merchant: 8/30/2023 7:58 PM EDT  Subject: Right ear    I would like to proceed to the next step with my right ear as it is no better. I think it was a hearing test that may need to be done before proceeding with a possible ear tube in my Eustachian canal. Please let me know if I need to schedule another appointment with Dr Leandro Muniz.  Thank you, Juan Carlos Kat

## 2023-09-11 ENCOUNTER — TELEPHONE (OUTPATIENT)
Dept: FAMILY MEDICINE CLINIC | Age: 63
End: 2023-09-11

## 2023-09-11 RX ORDER — OXYBUTYNIN CHLORIDE 10 MG/1
10 TABLET, EXTENDED RELEASE ORAL DAILY
Qty: 30 TABLET | Refills: 3 | Status: SHIPPED | OUTPATIENT
Start: 2023-09-11

## 2023-09-11 NOTE — TELEPHONE ENCOUNTER
----- Message from Crestline Noam sent at 9/10/2023  6:38 PM EDT -----  Regarding: Oxybutynin ER 10mg   Contact: [phone]  I would like to go back on the higher dose of the oxybutynin. I use to take the extended release but went back on the lower dose. I am having increasing issues with leakage during the day and nighttime trips to the bathroom and want to see if this could help. I'm due for a new refill so if this could be called over to the [facility], I would appreciate it. Thank you.

## 2023-09-12 DIAGNOSIS — H91.90 HEARING LOSS, UNSPECIFIED HEARING LOSS TYPE, UNSPECIFIED LATERALITY: Primary | ICD-10-CM

## 2023-09-19 ENCOUNTER — OFFICE VISIT (OUTPATIENT)
Dept: ENT CLINIC | Age: 63
End: 2023-09-19
Payer: MEDICARE

## 2023-09-19 ENCOUNTER — PROCEDURE VISIT (OUTPATIENT)
Dept: AUDIOLOGY | Age: 63
End: 2023-09-19
Payer: MEDICARE

## 2023-09-19 VITALS
BODY MASS INDEX: 26.05 KG/M2 | TEMPERATURE: 97.4 F | WEIGHT: 147 LBS | HEART RATE: 85 BPM | OXYGEN SATURATION: 98 % | RESPIRATION RATE: 16 BRPM | SYSTOLIC BLOOD PRESSURE: 147 MMHG | HEIGHT: 63 IN | DIASTOLIC BLOOD PRESSURE: 90 MMHG

## 2023-09-19 DIAGNOSIS — H69.81 DYSFUNCTION OF RIGHT EUSTACHIAN TUBE: Primary | ICD-10-CM

## 2023-09-19 DIAGNOSIS — H93.8X1 SENSATION OF FULLNESS IN RIGHT EAR: ICD-10-CM

## 2023-09-19 DIAGNOSIS — H90.3 SENSORINEURAL HEARING LOSS (SNHL) OF BOTH EARS: ICD-10-CM

## 2023-09-19 DIAGNOSIS — H90.3 SENSORINEURAL HEARING LOSS (SNHL) OF BOTH EARS: Primary | ICD-10-CM

## 2023-09-19 PROCEDURE — 92557 COMPREHENSIVE HEARING TEST: CPT | Performed by: AUDIOLOGIST

## 2023-09-19 PROCEDURE — 3017F COLORECTAL CA SCREEN DOC REV: CPT | Performed by: STUDENT IN AN ORGANIZED HEALTH CARE EDUCATION/TRAINING PROGRAM

## 2023-09-19 PROCEDURE — G8419 CALC BMI OUT NRM PARAM NOF/U: HCPCS | Performed by: STUDENT IN AN ORGANIZED HEALTH CARE EDUCATION/TRAINING PROGRAM

## 2023-09-19 PROCEDURE — 92567 TYMPANOMETRY: CPT | Performed by: AUDIOLOGIST

## 2023-09-19 PROCEDURE — 99213 OFFICE O/P EST LOW 20 MIN: CPT | Performed by: STUDENT IN AN ORGANIZED HEALTH CARE EDUCATION/TRAINING PROGRAM

## 2023-09-19 PROCEDURE — G8427 DOCREV CUR MEDS BY ELIG CLIN: HCPCS | Performed by: STUDENT IN AN ORGANIZED HEALTH CARE EDUCATION/TRAINING PROGRAM

## 2023-09-19 PROCEDURE — 1036F TOBACCO NON-USER: CPT | Performed by: STUDENT IN AN ORGANIZED HEALTH CARE EDUCATION/TRAINING PROGRAM

## 2023-09-19 NOTE — PROGRESS NOTES
Helen Eugene (:  1960) is a 59 y.o. female, here for evaluation of the following chief complaint(s):  Ear Fullness (Right )      ASSESSMENT/PLAN:  1. Dysfunction of right eustachian tube  2. Sensation of fullness in right ear  3. Sensorineural hearing loss (SNHL) of both ears      This is a very pleasant 59 y.o. female here today for evaluation of the the above-noted complaints. Audiogram reviewed with the patient. Discussed that she has sensorineural hearing loss and type a tympanograms. We discussed treatment options for her eustachian tube dysfunction including observation, tympanostomy tube and/or eustachian tube dilation. I recommended that she continue to observe this and see if it gets better. If it fails, we discussed we can place an ear tube at the follow-up visit. Medical Decision Making: The following items were considered in medical decision making:  Independent review of images  Review / order clinical lab tests  Review / order radiology tests  Decision to obtain old records  Review and summation of old records as accessed through [facility] if applicable    SUBJECTIVE/OBJECTIVE:  [facility]    James Jordan is here today for evaluation of hearing loss and ear fullness. She continues to have issues with inability to pop her right ear. She tries to pop it multiple times per day. She did not use the fluticasone recently. She tried several blocks of Sudafed with no improvement. REVIEW OF SYSTEMS  The following systems were reviewed and revealed the following in addition to any already discussed in the HPI:    PHYSICAL EXAM    GENERAL: No acute distress, alert and oriented, no hoarseness, strong voice  EYES: EOMI, Anti-icteric  HENT:   Head: Normocephalic and atraumatic.    Face:  Symmetric, facial nerve intact  Right Ear: Normal external ear, normal external auditory canal, intact tympanic membrane with

## 2023-10-12 RX ORDER — AMOXICILLIN 500 MG/1
2000 CAPSULE ORAL ONCE
Qty: 4 CAPSULE | Refills: 0 | Status: SHIPPED | OUTPATIENT
Start: 2023-10-12 | End: 2023-10-12

## 2023-11-10 RX ORDER — TRAZODONE HYDROCHLORIDE 50 MG/1
100 TABLET ORAL NIGHTLY
Qty: 60 TABLET | Refills: 5 | Status: SHIPPED | OUTPATIENT
Start: 2023-11-10

## 2023-11-10 RX ORDER — OMEPRAZOLE 20 MG/1
20 CAPSULE, DELAYED RELEASE ORAL DAILY
Qty: 90 CAPSULE | Refills: 1 | Status: SHIPPED | OUTPATIENT
Start: 2023-11-10

## 2023-11-10 RX ORDER — OXYBUTYNIN CHLORIDE 10 MG/1
10 TABLET, EXTENDED RELEASE ORAL DAILY
Qty: 30 TABLET | Refills: 3 | Status: SHIPPED | OUTPATIENT
Start: 2023-11-10

## 2023-11-27 RX ORDER — OMEPRAZOLE 20 MG/1
20 CAPSULE, DELAYED RELEASE ORAL DAILY
Qty: 90 CAPSULE | Refills: 1 | Status: SHIPPED | OUTPATIENT
Start: 2023-11-27

## 2024-02-08 RX ORDER — OXYBUTYNIN CHLORIDE 10 MG/1
10 TABLET, EXTENDED RELEASE ORAL DAILY
Qty: 90 TABLET | Refills: 3 | Status: SHIPPED | OUTPATIENT
Start: 2024-02-08

## 2024-04-23 RX ORDER — OMEPRAZOLE 20 MG/1
20 CAPSULE, DELAYED RELEASE ORAL DAILY
Qty: 90 CAPSULE | Refills: 1 | Status: SHIPPED | OUTPATIENT
Start: 2024-04-23

## 2024-04-23 RX ORDER — TRAZODONE HYDROCHLORIDE 50 MG/1
TABLET ORAL
Qty: 180 TABLET | Refills: 1 | Status: SHIPPED | OUTPATIENT
Start: 2024-04-23 | End: 2024-06-06

## 2024-04-29 RX ORDER — AMOXICILLIN 500 MG/1
2000 CAPSULE ORAL ONCE
Qty: 4 CAPSULE | Refills: 0 | Status: SHIPPED | OUTPATIENT
Start: 2024-04-29 | End: 2024-04-29

## 2024-05-14 ENCOUNTER — PATIENT MESSAGE (OUTPATIENT)
Dept: FAMILY MEDICINE CLINIC | Age: 64
End: 2024-05-14

## 2024-05-14 DIAGNOSIS — Z13.220 SCREENING FOR HYPERLIPIDEMIA: ICD-10-CM

## 2024-05-14 LAB
CHOLEST SERPL-MCNC: 165 MG/DL (ref 0–199)
HDLC SERPL-MCNC: 78 MG/DL (ref 40–60)
LDLC SERPL CALC-MCNC: 80 MG/DL
TRIGL SERPL-MCNC: 35 MG/DL (ref 0–150)
VLDLC SERPL CALC-MCNC: 7 MG/DL

## 2024-05-29 SDOH — HEALTH STABILITY: PHYSICAL HEALTH: ON AVERAGE, HOW MANY MINUTES DO YOU ENGAGE IN EXERCISE AT THIS LEVEL?: 10 MIN

## 2024-05-29 SDOH — HEALTH STABILITY: PHYSICAL HEALTH: ON AVERAGE, HOW MANY DAYS PER WEEK DO YOU ENGAGE IN MODERATE TO STRENUOUS EXERCISE (LIKE A BRISK WALK)?: 3 DAYS

## 2024-05-29 ASSESSMENT — LIFESTYLE VARIABLES
HOW OFTEN DO YOU HAVE SIX OR MORE DRINKS ON ONE OCCASION: 1
HOW MANY STANDARD DRINKS CONTAINING ALCOHOL DO YOU HAVE ON A TYPICAL DAY: 1 OR 2
HOW OFTEN DO YOU HAVE A DRINK CONTAINING ALCOHOL: 2
HOW OFTEN DO YOU HAVE A DRINK CONTAINING ALCOHOL: MONTHLY OR LESS
HOW MANY STANDARD DRINKS CONTAINING ALCOHOL DO YOU HAVE ON A TYPICAL DAY: 1

## 2024-05-29 ASSESSMENT — PATIENT HEALTH QUESTIONNAIRE - PHQ9
SUM OF ALL RESPONSES TO PHQ QUESTIONS 1-9: 2
2. FEELING DOWN, DEPRESSED OR HOPELESS: SEVERAL DAYS
SUM OF ALL RESPONSES TO PHQ QUESTIONS 1-9: 2
SUM OF ALL RESPONSES TO PHQ QUESTIONS 1-9: 2
1. LITTLE INTEREST OR PLEASURE IN DOING THINGS: SEVERAL DAYS
SUM OF ALL RESPONSES TO PHQ9 QUESTIONS 1 & 2: 2
SUM OF ALL RESPONSES TO PHQ QUESTIONS 1-9: 2

## 2024-06-03 ENCOUNTER — TELEPHONE (OUTPATIENT)
Dept: FAMILY MEDICINE CLINIC | Age: 64
End: 2024-06-03

## 2024-06-03 RX ORDER — AMOXICILLIN 500 MG/1
CAPSULE ORAL
Qty: 4 CAPSULE | Refills: 0 | Status: SHIPPED | OUTPATIENT
Start: 2024-06-03 | End: 2024-06-06

## 2024-06-03 NOTE — TELEPHONE ENCOUNTER
----- Message from uSgey Salgado sent at 6/3/2024  7:18 AM EDT -----  Regarding: antibiotic  Contact: [phone]  Hello, I am having several crowns replaced this Wednesday and my dentist once me to take an antibiotic for this procedure. Thanks

## 2024-06-06 ENCOUNTER — OFFICE VISIT (OUTPATIENT)
Dept: FAMILY MEDICINE CLINIC | Age: 64
End: 2024-06-06

## 2024-06-06 VITALS
WEIGHT: 137.8 LBS | OXYGEN SATURATION: 95 % | BODY MASS INDEX: 24.41 KG/M2 | TEMPERATURE: 98.2 F | DIASTOLIC BLOOD PRESSURE: 56 MMHG | HEART RATE: 87 BPM | HEIGHT: 63 IN | SYSTOLIC BLOOD PRESSURE: 94 MMHG

## 2024-06-06 DIAGNOSIS — G47.9 SLEEP DISTURBANCE: ICD-10-CM

## 2024-06-06 DIAGNOSIS — Z12.4 CERVICAL CANCER SCREENING: ICD-10-CM

## 2024-06-06 DIAGNOSIS — Z00.00 MEDICARE ANNUAL WELLNESS VISIT, SUBSEQUENT: Primary | ICD-10-CM

## 2024-06-06 RX ORDER — DOXEPIN HYDROCHLORIDE 10 MG/1
10 CAPSULE ORAL NIGHTLY
Qty: 30 CAPSULE | Refills: 3 | Status: SHIPPED | OUTPATIENT
Start: 2024-06-06

## 2024-06-06 NOTE — PROGRESS NOTES
Medicare Annual Wellness Visit    Sugey Salgado is here for Medicare AWV (AMW. Pt got colonoscopy done with Dr Sin. I will call and get report. )    Assessment & Plan   Medicare annual wellness visit, subsequent  Pt will get RSV vacc with flu and covid booster this flu season  Sleep disturbance  New. Trial sleep med. Continue health sleep habits  -     doxepin (SINEQUAN) 10 MG capsule; Take 1 capsule by mouth nightly, Disp-30 capsule, R-3Normal  Cervical cancer screening  Due. Call and schedule  -     Radha Hooper MD, Gynecology, [place]    Recommendations for Preventive Services Due: see orders and patient instructions/AVS.  Recommended screening schedule for the next 5-10 years is provided to the patient in written form: see Patient Instructions/AVS.     Return for yearly physical.     Subjective     Sleep- takes trazodone nightly, horrible sleep issues for the last year. States the trazodone helps fall asleep but does not stay asleep. Used multiple sleep aids, sleepy time tea, meditations, reading and gummies no relief    Patient's complete Health Risk Assessment and screening values have been reviewed and are found in Flowsheets. The following problems were reviewed today and where indicated follow up appointments were made and/or referrals ordered.    Positive Risk Factor Screenings with Interventions:                      Advanced Directives:  Do you have a Living Will?: (!) No    Intervention:  has NO advanced directive - information provided                     Objective   Vitals:    06/06/24 0732   BP: (!) 94/56   Site: Left Upper Arm   Position: Sitting   Cuff Size: Medium Adult   Pulse: 87   Temp: 98.2 °F (36.8 °C)   TempSrc: Temporal   SpO2: 95%   Weight: 62.5 kg (137 lb 12.8 oz)   Height: 1.6 m (5' 2.99\")      Body mass index is 24.42 kg/m².             No Known Allergies  Prior to Visit Medications    Medication Sig Taking? Authorizing Provider   doxepin (SINEQUAN) 10 MG capsule

## 2024-07-30 ENCOUNTER — TELEPHONE (OUTPATIENT)
Dept: FAMILY MEDICINE CLINIC | Age: 64
End: 2024-07-30

## 2024-07-30 ENCOUNTER — PATIENT MESSAGE (OUTPATIENT)
Dept: FAMILY MEDICINE CLINIC | Age: 64
End: 2024-07-30

## 2024-07-30 DIAGNOSIS — G47.9 SLEEP DISTURBANCE: Primary | ICD-10-CM

## 2024-07-30 RX ORDER — ZOLPIDEM TARTRATE 5 MG/1
5 TABLET ORAL NIGHTLY PRN
Qty: 30 TABLET | Refills: 0 | Status: SHIPPED | OUTPATIENT
Start: 2024-07-30 | End: 2024-08-29

## 2024-07-30 NOTE — TELEPHONE ENCOUNTER
----- Message from Sugey Salgado sent at 7/30/2024  8:42 AM EDT -----  Regarding: Zolpidem  Contact: [phone]  The medication Doxepin is not working as well as I thought it would.  I would like to try zolpidem for a trial basis of a couple weeks to see if it works better for me. I was on it years ago with no side effects. I don't work so next morning grogginess will not be a problem.  Please let me know. Sugey

## 2024-07-31 ENCOUNTER — HOSPITAL ENCOUNTER (OUTPATIENT)
Dept: WOMENS IMAGING | Age: 64
Discharge: HOME | End: 2024-07-31
Payer: MEDICARE

## 2024-07-31 VITALS — BODY MASS INDEX: 25.21 KG/M2 | WEIGHT: 137 LBS | HEIGHT: 62 IN

## 2024-07-31 DIAGNOSIS — Z12.31 BREAST CANCER SCREENING BY MAMMOGRAM: ICD-10-CM

## 2024-07-31 PROCEDURE — 77063 BREAST TOMOSYNTHESIS BI: CPT

## 2024-08-08 ENCOUNTER — PATIENT MESSAGE (OUTPATIENT)
Dept: FAMILY MEDICINE CLINIC | Age: 64
End: 2024-08-08

## 2024-10-01 DIAGNOSIS — G47.9 SLEEP DISTURBANCE: ICD-10-CM

## 2024-10-01 RX ORDER — DOXEPIN HYDROCHLORIDE 10 MG/1
10 CAPSULE ORAL NIGHTLY
Qty: 30 CAPSULE | Refills: 3 | Status: SHIPPED | OUTPATIENT
Start: 2024-10-01

## 2024-11-22 RX ORDER — AMOXICILLIN 500 MG/1
2000 CAPSULE ORAL ONCE
Qty: 4 CAPSULE | Refills: 0 | Status: SHIPPED | OUTPATIENT
Start: 2024-11-22 | End: 2024-11-22

## 2024-12-02 RX ORDER — OXYBUTYNIN CHLORIDE 10 MG/1
10 TABLET, EXTENDED RELEASE ORAL DAILY
Qty: 90 TABLET | Refills: 1 | Status: SHIPPED | OUTPATIENT
Start: 2024-12-02

## 2025-01-24 ENCOUNTER — PATIENT MESSAGE (OUTPATIENT)
Dept: FAMILY MEDICINE CLINIC | Age: 65
End: 2025-01-24

## 2025-01-30 DIAGNOSIS — G47.9 SLEEP DISTURBANCE: ICD-10-CM

## 2025-01-30 RX ORDER — DOXEPIN HYDROCHLORIDE 10 MG/1
10 CAPSULE ORAL NIGHTLY
Qty: 30 CAPSULE | Refills: 3 | Status: SHIPPED | OUTPATIENT
Start: 2025-01-30

## 2025-03-24 DIAGNOSIS — G47.9 SLEEP DISTURBANCE: ICD-10-CM

## 2025-03-24 RX ORDER — DOXEPIN HYDROCHLORIDE 10 MG/1
10 CAPSULE ORAL NIGHTLY
Qty: 30 CAPSULE | Refills: 0 | Status: SHIPPED | OUTPATIENT
Start: 2025-03-24

## 2025-04-15 DIAGNOSIS — G47.9 SLEEP DISTURBANCE: ICD-10-CM

## 2025-04-15 RX ORDER — DOXEPIN HYDROCHLORIDE 10 MG/1
CAPSULE ORAL
Qty: 30 CAPSULE | Refills: 0 | OUTPATIENT
Start: 2025-04-15

## 2025-04-15 NOTE — TELEPHONE ENCOUNTER
I sent in 30 days of  ambien back in July. Pt was supposed to follow up in office after trying medication

## 2025-04-15 NOTE — TELEPHONE ENCOUNTER
Spoke with pt she isn't taking Doxepin it doesn't work. Pt would like to try something else but not sure if you need to see her. Pt stated do not fill it.

## 2025-04-21 DIAGNOSIS — G47.9 SLEEP DISTURBANCE: ICD-10-CM

## 2025-04-21 RX ORDER — DOXEPIN HYDROCHLORIDE 10 MG/1
CAPSULE ORAL
Qty: 30 CAPSULE | Refills: 0 | OUTPATIENT
Start: 2025-04-21

## 2025-06-17 ENCOUNTER — OFFICE VISIT (OUTPATIENT)
Dept: FAMILY MEDICINE CLINIC | Age: 65
End: 2025-06-17
Payer: COMMERCIAL

## 2025-06-17 VITALS
DIASTOLIC BLOOD PRESSURE: 78 MMHG | HEART RATE: 93 BPM | HEIGHT: 62 IN | TEMPERATURE: 97.3 F | BODY MASS INDEX: 27.97 KG/M2 | OXYGEN SATURATION: 98 % | WEIGHT: 152 LBS | SYSTOLIC BLOOD PRESSURE: 122 MMHG

## 2025-06-17 DIAGNOSIS — Z12.4 CERVICAL CANCER SCREENING: ICD-10-CM

## 2025-06-17 DIAGNOSIS — F32.A DEPRESSION, UNSPECIFIED DEPRESSION TYPE: ICD-10-CM

## 2025-06-17 DIAGNOSIS — D50.9 IRON DEFICIENCY ANEMIA, UNSPECIFIED IRON DEFICIENCY ANEMIA TYPE: ICD-10-CM

## 2025-06-17 DIAGNOSIS — Z00.00 MEDICARE ANNUAL WELLNESS VISIT, SUBSEQUENT: Primary | ICD-10-CM

## 2025-06-17 DIAGNOSIS — F10.11 H/O ALCOHOL ABUSE: ICD-10-CM

## 2025-06-17 PROCEDURE — G0439 PPPS, SUBSEQ VISIT: HCPCS | Performed by: NURSE PRACTITIONER

## 2025-06-17 RX ORDER — GABAPENTIN 300 MG/1
300 CAPSULE ORAL DAILY
COMMUNITY
Start: 2025-04-25 | End: 2025-06-17

## 2025-06-17 RX ORDER — NALTREXONE HYDROCHLORIDE 50 MG/1
50 TABLET, FILM COATED ORAL DAILY
Qty: 30 TABLET | Refills: 0 | Status: SHIPPED | OUTPATIENT
Start: 2025-06-17

## 2025-06-17 RX ORDER — SERTRALINE HYDROCHLORIDE 25 MG/1
25 TABLET, FILM COATED ORAL DAILY
Qty: 90 TABLET | Refills: 1 | Status: SHIPPED | OUTPATIENT
Start: 2025-06-17

## 2025-06-17 RX ORDER — OXYBUTYNIN CHLORIDE 10 MG/1
10 TABLET, EXTENDED RELEASE ORAL DAILY
Qty: 90 TABLET | Refills: 1 | Status: SHIPPED | OUTPATIENT
Start: 2025-06-17

## 2025-06-17 RX ORDER — OMEPRAZOLE 20 MG/1
20 CAPSULE, DELAYED RELEASE ORAL DAILY
Qty: 90 CAPSULE | Refills: 3 | Status: SHIPPED | OUTPATIENT
Start: 2025-06-17

## 2025-06-17 RX ORDER — DISULFIRAM 250 MG/1
250 TABLET ORAL DAILY
COMMUNITY
Start: 2025-04-28 | End: 2025-06-17

## 2025-06-17 RX ORDER — NALTREXONE HYDROCHLORIDE 50 MG/1
50 TABLET, FILM COATED ORAL DAILY
COMMUNITY
Start: 2025-05-20 | End: 2025-06-17 | Stop reason: SDUPTHER

## 2025-06-17 SDOH — ECONOMIC STABILITY: FOOD INSECURITY: WITHIN THE PAST 12 MONTHS, YOU WORRIED THAT YOUR FOOD WOULD RUN OUT BEFORE YOU GOT MONEY TO BUY MORE.: NEVER TRUE

## 2025-06-17 SDOH — ECONOMIC STABILITY: FOOD INSECURITY: WITHIN THE PAST 12 MONTHS, THE FOOD YOU BOUGHT JUST DIDN'T LAST AND YOU DIDN'T HAVE MONEY TO GET MORE.: NEVER TRUE

## 2025-06-17 ASSESSMENT — PATIENT HEALTH QUESTIONNAIRE - PHQ9
2. FEELING DOWN, DEPRESSED OR HOPELESS: NOT AT ALL
9. THOUGHTS THAT YOU WOULD BE BETTER OFF DEAD, OR OF HURTING YOURSELF: NOT AT ALL
8. MOVING OR SPEAKING SO SLOWLY THAT OTHER PEOPLE COULD HAVE NOTICED. OR THE OPPOSITE, BEING SO FIGETY OR RESTLESS THAT YOU HAVE BEEN MOVING AROUND A LOT MORE THAN USUAL: NOT AT ALL
10. IF YOU CHECKED OFF ANY PROBLEMS, HOW DIFFICULT HAVE THESE PROBLEMS MADE IT FOR YOU TO DO YOUR WORK, TAKE CARE OF THINGS AT HOME, OR GET ALONG WITH OTHER PEOPLE: NOT DIFFICULT AT ALL
5. POOR APPETITE OR OVEREATING: NOT AT ALL
SUM OF ALL RESPONSES TO PHQ QUESTIONS 1-9: 0
SUM OF ALL RESPONSES TO PHQ QUESTIONS 1-9: 0
3. TROUBLE FALLING OR STAYING ASLEEP: NOT AT ALL
6. FEELING BAD ABOUT YOURSELF - OR THAT YOU ARE A FAILURE OR HAVE LET YOURSELF OR YOUR FAMILY DOWN: NOT AT ALL
4. FEELING TIRED OR HAVING LITTLE ENERGY: NOT AT ALL
7. TROUBLE CONCENTRATING ON THINGS, SUCH AS READING THE NEWSPAPER OR WATCHING TELEVISION: NOT AT ALL
SUM OF ALL RESPONSES TO PHQ QUESTIONS 1-9: 0
SUM OF ALL RESPONSES TO PHQ QUESTIONS 1-9: 0
1. LITTLE INTEREST OR PLEASURE IN DOING THINGS: NOT AT ALL

## 2025-06-17 ASSESSMENT — LIFESTYLE VARIABLES
HOW MANY STANDARD DRINKS CONTAINING ALCOHOL DO YOU HAVE ON A TYPICAL DAY: PATIENT DOES NOT DRINK
HOW OFTEN DO YOU HAVE A DRINK CONTAINING ALCOHOL: NEVER

## 2025-06-17 NOTE — PROGRESS NOTES
Medicare Annual Wellness Visit    Sugey Salgado is here for Medicare AWV and Depression (Wants to discuss this with provider)    Assessment & Plan   Medicare annual wellness visit, subsequent  Reviewed care gaps and immunizations.  H/O alcohol abuse  Stable. Pt requested provider take over treatment center med to continue to abstain from ETOH.   -     naltrexone (DEPADE) 50 MG tablet; Take 1 tablet by mouth daily, Disp-30 tablet, R-0Normal    Cervical cancer screening  Due. Call and schedule.   -     Carmen Brown MD, Gynecology, [place]    Depression, unspecified depression type  New. Trial new med, est with outpt therapy as referred from treatment center, f/u with me in 4 wks.   -     sertraline (ZOLOFT) 25 MG tablet; Take 1 tablet by mouth daily, Disp-90 tablet, R-1Normal    Iron deficiency anemia, unspecified iron deficiency anemia type  Stable. Due for labs.   -     CBC; Future  -     Comprehensive Metabolic Panel; Future  -     Iron and TIBC; Future       Return in about 4 weeks (around 7/15/2025).     Subjective     Checked into alcohol detox program. Taking depade 50 mg daily, pt is not longer drinking, curbs ETOH craving, requests this provider assume script.   Pt is due for PAP smear  Patient's complete Health Risk Assessment and screening values have been reviewed and are found in Flowsheets. The following problems were reviewed today and where indicated follow up appointments were made and/or referrals ordered.    Positive Risk Factor Screenings with Interventions:                   Vision Screen:  Do you have difficulty driving, watching TV, or doing any of your daily activities because of your eyesight?: No  Have you had an eye exam within the past year?: (!) No  Interventions:   Pt has scheduled      Advanced Directives:  Do you have a Living Will?: (!) No    Intervention:  has NO advanced directive - information provided                     Objective   Vitals:    06/17/25 0902

## 2025-06-17 NOTE — PATIENT INSTRUCTIONS
Due for Pneumonia vaccine and Tdap booster       Learning About Vision Tests  What are vision tests?     The four most common vision tests are visual acuity tests, refraction, visual field tests, and color vision tests.  Visual acuity (sharpness) tests  These tests are used:  To see if you need glasses or contact lenses.  To monitor an eye problem.  To check an eye injury.  Visual acuity tests are done as part of routine exams. You may also have this test when you get your 's license or apply for some types of jobs.  Visual field tests  These tests are used:  To check for vision loss in any area of your range of vision.  To screen for certain eye diseases.  To look for nerve damage after a stroke, head injury, or other problem that could reduce blood flow to the brain.  Refraction and color tests  A refraction test is done to find the right prescription for glasses and contact lenses.  A color vision test is done to check for color blindness.  Color vision is often tested as part of a routine exam. You may also have this test when you apply for a job where recognizing different colors is important, such as , electronics, or the .  How are vision tests done?  Visual acuity test   You cover one eye at a time.  You read aloud from a wall chart across the room.  You read aloud from a small card that you hold in your hand.  Refraction   You look into a special device.  The device puts lenses of different strengths in front of each eye to see how strong your glasses or contact lenses need to be.  Visual field tests   Your doctor may have you look through special machines.  Or your doctor may simply have you stare straight ahead while they move a finger into and out of your field of vision.  Color vision test   You look at pieces of printed test patterns in various colors. You say what number or symbol you see.  Your doctor may have you trace the number or symbol using a pointer.  How do these

## (undated) DEVICE — SOLUTION IV IRRIG POUR BRL 0.9% SODIUM CHL 2F7124

## (undated) DEVICE — KIT POS FOAM HANA TBL

## (undated) DEVICE — SUTURE STRATAFIX SPRL SZ 2 0 L14IN ABSRB UD MH L36MM 1 2 CIR SXMD2B401

## (undated) DEVICE — SKIN MARKER,REGULAR TIP WITH RULER AND LABELS: Brand: DEVON

## (undated) DEVICE — DUAL CUT SAGITTAL BLADE

## (undated) DEVICE — SYRINGE MED 10ML LUERLOCK TIP W/O SFTY DISP

## (undated) DEVICE — GLOVE SURG SZ 85 L12IN FNGR ORTHO 126MIL CRM LTX FREE

## (undated) DEVICE — SUTURE STRATAFIX SPRL SZ 3-0 L12IN ABSRB UD FS-1 L30X30CM SXMP2B410

## (undated) DEVICE — TOTAL BASIC: Brand: MEDLINE INDUSTRIES, INC.

## (undated) DEVICE — GLOVE SURG SZ 8 L12IN FNGR THK79MIL GRN LTX FREE

## (undated) DEVICE — SOLUTION PREP POVIDONE IOD FOR SKIN MUCOUS MEM PRIOR TO

## (undated) DEVICE — BASIC SINGLE BASIN 1-LF: Brand: MEDLINE INDUSTRIES, INC.

## (undated) DEVICE — RETRACTOR SURG WIDE FLAT LO PROF LTWT PHOTONGUIDE

## (undated) DEVICE — 3M™ MICROPORE™ TAPE, 1530-1: Brand: 3M™ MICROPORE™

## (undated) DEVICE — ELECTRODE BLDE L4IN NONINSULATED EDGE

## (undated) DEVICE — SUTURE ABSORBABLE MONOFILAMENT 1-0 OS8 14 IN STRATAFIX SPRL SXPD2B202

## (undated) DEVICE — SHEER ADHESIVE BANDAGE: Brand: CURITY

## (undated) DEVICE — CHLORAPREP 26ML ORANGE

## (undated) DEVICE — 1010 S-DRAPE TOWEL DRAPE 10/BX: Brand: STERI-DRAPE™

## (undated) DEVICE — BIPOLAR SEALER 23-112-1 AQM 6.0: Brand: AQUAMANTYS ®

## (undated) DEVICE — TOWEL,OR,DSP,ST,BLUE,STD,6/PK,12PK/CS: Brand: MEDLINE

## (undated) DEVICE — GLOVE ORANGE PI 7 1/2   MSG9075

## (undated) DEVICE — C-ARM: Brand: UNBRANDED

## (undated) DEVICE — SYRINGE 60ML BULB IRRIG ST LF

## (undated) DEVICE — SYRINGE IRRIG 60ML SFT PLIABLE BLB EZ TO GRP 1 HND USE W/

## (undated) DEVICE — 6619 2 PTNT ISO SYS INCISE AREA&LT;(&GT;&&LT;)&GT;P: Brand: STERI-DRAPE™ IOBAN™ 2

## (undated) DEVICE — NEEDLE SPNL 20GA LNG YEL HUB DISP

## (undated) DEVICE — GLOVE ORTHO 8   MSG9480

## (undated) DEVICE — PAD DRY FLOOR ABS 32X58IN GRN

## (undated) DEVICE — SOLUTION IV 1000ML 0.9% SOD CHL FOR IRRIG PLAS CONT

## (undated) DEVICE — NEEDLE HYPO 18GA L1.5IN THN WALL PIVOTING SHLD BVL ORIENTED